# Patient Record
Sex: MALE | Race: WHITE | Employment: FULL TIME | URBAN - METROPOLITAN AREA
[De-identification: names, ages, dates, MRNs, and addresses within clinical notes are randomized per-mention and may not be internally consistent; named-entity substitution may affect disease eponyms.]

---

## 2017-02-17 RX ORDER — ATORVASTATIN CALCIUM 20 MG/1
TABLET, FILM COATED ORAL
Qty: 30 TABLET | Refills: 3 | Status: SHIPPED | OUTPATIENT
Start: 2017-02-17 | End: 2017-06-27 | Stop reason: SDUPTHER

## 2017-03-17 DIAGNOSIS — I20.9 ISCHEMIC CHEST PAIN (HCC): ICD-10-CM

## 2017-03-17 DIAGNOSIS — Z95.820 S/P ANGIOPLASTY WITH STENT: ICD-10-CM

## 2017-03-17 DIAGNOSIS — I25.10 CORONARY ARTERY DISEASE INVOLVING NATIVE CORONARY ARTERY OF NATIVE HEART WITHOUT ANGINA PECTORIS: ICD-10-CM

## 2017-03-17 LAB
A/G RATIO: 1.8 (ref 1.1–2.2)
ALBUMIN SERPL-MCNC: 4.3 G/DL (ref 3.4–5)
ALP BLD-CCNC: 75 U/L (ref 40–129)
ALT SERPL-CCNC: 23 U/L (ref 10–40)
ANION GAP SERPL CALCULATED.3IONS-SCNC: 14 MMOL/L (ref 3–16)
AST SERPL-CCNC: 17 U/L (ref 15–37)
BILIRUB SERPL-MCNC: 0.4 MG/DL (ref 0–1)
BILIRUBIN DIRECT: <0.2 MG/DL (ref 0–0.3)
BILIRUBIN, INDIRECT: NORMAL MG/DL (ref 0–1)
BUN BLDV-MCNC: 16 MG/DL (ref 7–20)
CALCIUM SERPL-MCNC: 9.7 MG/DL (ref 8.3–10.6)
CHLORIDE BLD-SCNC: 99 MMOL/L (ref 99–110)
CHOLESTEROL, TOTAL: 131 MG/DL (ref 0–199)
CO2: 26 MMOL/L (ref 21–32)
CREAT SERPL-MCNC: 1 MG/DL (ref 0.9–1.3)
GFR AFRICAN AMERICAN: >60
GFR NON-AFRICAN AMERICAN: >60
GLOBULIN: 2.4 G/DL
GLUCOSE BLD-MCNC: 85 MG/DL (ref 70–99)
HCT VFR BLD CALC: 46.8 % (ref 40.5–52.5)
HDLC SERPL-MCNC: 37 MG/DL (ref 40–60)
HEMOGLOBIN: 15.9 G/DL (ref 13.5–17.5)
LDL CHOLESTEROL CALCULATED: 69 MG/DL
MAGNESIUM: 2 MG/DL (ref 1.8–2.4)
MCH RBC QN AUTO: 29.7 PG (ref 26–34)
MCHC RBC AUTO-ENTMCNC: 33.9 G/DL (ref 31–36)
MCV RBC AUTO: 87.7 FL (ref 80–100)
PDW BLD-RTO: 12.7 % (ref 12.4–15.4)
PLATELET # BLD: 248 K/UL (ref 135–450)
PMV BLD AUTO: 9.5 FL (ref 5–10.5)
POTASSIUM SERPL-SCNC: 4.2 MMOL/L (ref 3.5–5.1)
RBC # BLD: 5.34 M/UL (ref 4.2–5.9)
SODIUM BLD-SCNC: 139 MMOL/L (ref 136–145)
TOTAL PROTEIN: 6.7 G/DL (ref 6.4–8.2)
TRIGL SERPL-MCNC: 123 MG/DL (ref 0–150)
TSH REFLEX FT4: 2.25 UIU/ML (ref 0.27–4.2)
VLDLC SERPL CALC-MCNC: 25 MG/DL
WBC # BLD: 8.8 K/UL (ref 4–11)

## 2017-06-01 RX ORDER — CLOPIDOGREL BISULFATE 75 MG/1
TABLET ORAL
Qty: 30 TABLET | Refills: 6 | Status: SHIPPED | OUTPATIENT
Start: 2017-06-01 | End: 2018-01-12 | Stop reason: SDUPTHER

## 2017-06-02 RX ORDER — LISINOPRIL 2.5 MG/1
2.5 TABLET ORAL DAILY
Qty: 30 TABLET | Refills: 6 | Status: SHIPPED | OUTPATIENT
Start: 2017-06-02 | End: 2018-01-10 | Stop reason: SDUPTHER

## 2017-06-22 ENCOUNTER — OFFICE VISIT (OUTPATIENT)
Dept: CARDIOLOGY CLINIC | Age: 57
End: 2017-06-22

## 2017-06-22 VITALS
BODY MASS INDEX: 24.88 KG/M2 | WEIGHT: 178.4 LBS | HEART RATE: 64 BPM | SYSTOLIC BLOOD PRESSURE: 128 MMHG | DIASTOLIC BLOOD PRESSURE: 62 MMHG

## 2017-06-22 DIAGNOSIS — I25.10 CORONARY ARTERY DISEASE INVOLVING NATIVE CORONARY ARTERY OF NATIVE HEART WITHOUT ANGINA PECTORIS: Primary | ICD-10-CM

## 2017-06-22 DIAGNOSIS — Z95.820 S/P ANGIOPLASTY WITH STENT: ICD-10-CM

## 2017-06-22 PROCEDURE — 99214 OFFICE O/P EST MOD 30 MIN: CPT | Performed by: INTERNAL MEDICINE

## 2017-06-27 RX ORDER — ATORVASTATIN CALCIUM 20 MG/1
TABLET, FILM COATED ORAL
Qty: 30 TABLET | Refills: 6 | Status: SHIPPED | OUTPATIENT
Start: 2017-06-27 | End: 2018-01-26 | Stop reason: SDUPTHER

## 2017-11-01 ENCOUNTER — HOSPITAL ENCOUNTER (OUTPATIENT)
Dept: OTHER | Age: 57
Discharge: HOME | End: 2017-11-30
Attending: INTERNAL MEDICINE | Admitting: INTERNAL MEDICINE

## 2017-12-01 ENCOUNTER — HOSPITAL ENCOUNTER (OUTPATIENT)
Dept: OTHER | Age: 57
Discharge: HOME | End: 2017-12-31
Attending: INTERNAL MEDICINE | Admitting: INTERNAL MEDICINE

## 2018-01-01 ENCOUNTER — HOSPITAL ENCOUNTER (OUTPATIENT)
Dept: OTHER | Age: 58
Discharge: HOME | End: 2018-01-31
Attending: INTERNAL MEDICINE | Admitting: INTERNAL MEDICINE

## 2018-01-10 RX ORDER — LISINOPRIL 2.5 MG/1
2.5 TABLET ORAL DAILY
Qty: 30 TABLET | Refills: 6 | Status: SHIPPED | OUTPATIENT
Start: 2018-01-10 | End: 2018-08-07 | Stop reason: SDUPTHER

## 2018-01-12 RX ORDER — CLOPIDOGREL BISULFATE 75 MG/1
75 TABLET ORAL DAILY
Qty: 30 TABLET | Refills: 0 | Status: CANCELLED | OUTPATIENT
Start: 2018-01-12

## 2018-01-12 RX ORDER — CLOPIDOGREL BISULFATE 75 MG/1
75 TABLET ORAL DAILY
Qty: 30 TABLET | Refills: 6 | Status: SHIPPED | OUTPATIENT
Start: 2018-01-12 | End: 2018-08-09 | Stop reason: SDUPTHER

## 2018-01-26 ENCOUNTER — TELEPHONE (OUTPATIENT)
Dept: CARDIOLOGY CLINIC | Age: 58
End: 2018-01-26

## 2018-01-26 RX ORDER — ATORVASTATIN CALCIUM 20 MG/1
TABLET, FILM COATED ORAL
Qty: 90 TABLET | Refills: 3 | Status: SHIPPED | OUTPATIENT
Start: 2018-01-26 | End: 2019-02-19 | Stop reason: SDUPTHER

## 2018-02-01 ENCOUNTER — HOSPITAL ENCOUNTER (OUTPATIENT)
Dept: OTHER | Age: 58
Discharge: HOME | End: 2018-02-28
Attending: INTERNAL MEDICINE | Admitting: INTERNAL MEDICINE

## 2018-03-01 ENCOUNTER — HOSPITAL ENCOUNTER (OUTPATIENT)
Dept: OTHER | Age: 58
Discharge: HOME | End: 2018-03-31
Attending: INTERNAL MEDICINE | Admitting: INTERNAL MEDICINE

## 2018-04-01 ENCOUNTER — HOSPITAL ENCOUNTER (OUTPATIENT)
Dept: OTHER | Age: 58
Discharge: HOME | End: 2018-04-30
Attending: INTERNAL MEDICINE | Admitting: INTERNAL MEDICINE

## 2018-05-01 ENCOUNTER — HOSPITAL ENCOUNTER (OUTPATIENT)
Dept: OTHER | Age: 58
Discharge: HOME | End: 2018-05-31
Attending: INTERNAL MEDICINE | Admitting: INTERNAL MEDICINE

## 2018-06-01 ENCOUNTER — HOSPITAL ENCOUNTER (OUTPATIENT)
Dept: OTHER | Age: 58
Discharge: HOME | End: 2018-06-30
Attending: INTERNAL MEDICINE | Admitting: INTERNAL MEDICINE

## 2018-06-07 DIAGNOSIS — I25.10 CORONARY ARTERY DISEASE INVOLVING NATIVE CORONARY ARTERY OF NATIVE HEART WITHOUT ANGINA PECTORIS: Primary | ICD-10-CM

## 2018-06-07 DIAGNOSIS — R07.9 CHEST PAIN, UNSPECIFIED TYPE: ICD-10-CM

## 2018-06-07 DIAGNOSIS — Z95.820 S/P ANGIOPLASTY WITH STENT: ICD-10-CM

## 2018-06-08 DIAGNOSIS — R07.9 CHEST PAIN, UNSPECIFIED TYPE: ICD-10-CM

## 2018-06-08 DIAGNOSIS — I25.10 CORONARY ARTERY DISEASE INVOLVING NATIVE CORONARY ARTERY OF NATIVE HEART WITHOUT ANGINA PECTORIS: ICD-10-CM

## 2018-06-08 DIAGNOSIS — Z95.820 S/P ANGIOPLASTY WITH STENT: ICD-10-CM

## 2018-06-08 LAB
A/G RATIO: 1.9 (ref 1.1–2.2)
ALBUMIN SERPL-MCNC: 4.3 G/DL (ref 3.4–5)
ALP BLD-CCNC: 74 U/L (ref 40–129)
ALT SERPL-CCNC: 16 U/L (ref 10–40)
ANION GAP SERPL CALCULATED.3IONS-SCNC: 13 MMOL/L (ref 3–16)
AST SERPL-CCNC: 14 U/L (ref 15–37)
BASOPHILS ABSOLUTE: 0 K/UL (ref 0–0.2)
BASOPHILS RELATIVE PERCENT: 0.2 %
BILIRUB SERPL-MCNC: 0.4 MG/DL (ref 0–1)
BILIRUBIN DIRECT: <0.2 MG/DL (ref 0–0.3)
BILIRUBIN, INDIRECT: NORMAL MG/DL (ref 0–1)
BUN BLDV-MCNC: 18 MG/DL (ref 7–20)
CALCIUM SERPL-MCNC: 9.7 MG/DL (ref 8.3–10.6)
CHLORIDE BLD-SCNC: 102 MMOL/L (ref 99–110)
CHOLESTEROL, TOTAL: 124 MG/DL (ref 0–199)
CO2: 27 MMOL/L (ref 21–32)
CREAT SERPL-MCNC: 0.9 MG/DL (ref 0.9–1.3)
EOSINOPHILS ABSOLUTE: 0.2 K/UL (ref 0–0.6)
EOSINOPHILS RELATIVE PERCENT: 2 %
GFR AFRICAN AMERICAN: >60
GFR NON-AFRICAN AMERICAN: >60
GLOBULIN: 2.3 G/DL
GLUCOSE BLD-MCNC: 87 MG/DL (ref 70–99)
HCT VFR BLD CALC: 46.7 % (ref 40.5–52.5)
HDLC SERPL-MCNC: 35 MG/DL (ref 40–60)
HEMOGLOBIN: 15.9 G/DL (ref 13.5–17.5)
LDL CHOLESTEROL CALCULATED: 67 MG/DL
LYMPHOCYTES ABSOLUTE: 1.7 K/UL (ref 1–5.1)
LYMPHOCYTES RELATIVE PERCENT: 18.6 %
MAGNESIUM: 2 MG/DL (ref 1.8–2.4)
MCH RBC QN AUTO: 29.8 PG (ref 26–34)
MCHC RBC AUTO-ENTMCNC: 34.1 G/DL (ref 31–36)
MCV RBC AUTO: 87.6 FL (ref 80–100)
MONOCYTES ABSOLUTE: 0.7 K/UL (ref 0–1.3)
MONOCYTES RELATIVE PERCENT: 7.2 %
NEUTROPHILS ABSOLUTE: 6.7 K/UL (ref 1.7–7.7)
NEUTROPHILS RELATIVE PERCENT: 72 %
PDW BLD-RTO: 13 % (ref 12.4–15.4)
PLATELET # BLD: 236 K/UL (ref 135–450)
PMV BLD AUTO: 9.5 FL (ref 5–10.5)
POTASSIUM SERPL-SCNC: 4.4 MMOL/L (ref 3.5–5.1)
RBC # BLD: 5.33 M/UL (ref 4.2–5.9)
SODIUM BLD-SCNC: 142 MMOL/L (ref 136–145)
TOTAL PROTEIN: 6.6 G/DL (ref 6.4–8.2)
TRIGL SERPL-MCNC: 111 MG/DL (ref 0–150)
TSH REFLEX FT4: 1.89 UIU/ML (ref 0.27–4.2)
VLDLC SERPL CALC-MCNC: 22 MG/DL
WBC # BLD: 9.2 K/UL (ref 4–11)

## 2018-06-26 ENCOUNTER — OFFICE VISIT (OUTPATIENT)
Dept: CARDIOLOGY CLINIC | Age: 58
End: 2018-06-26

## 2018-06-26 VITALS
DIASTOLIC BLOOD PRESSURE: 68 MMHG | HEART RATE: 58 BPM | WEIGHT: 171.4 LBS | BODY MASS INDEX: 23.91 KG/M2 | SYSTOLIC BLOOD PRESSURE: 104 MMHG

## 2018-06-26 DIAGNOSIS — Z95.820 S/P ANGIOPLASTY WITH STENT: ICD-10-CM

## 2018-06-26 DIAGNOSIS — I25.10 CORONARY ARTERY DISEASE INVOLVING NATIVE CORONARY ARTERY OF NATIVE HEART WITHOUT ANGINA PECTORIS: Primary | ICD-10-CM

## 2018-06-26 PROCEDURE — 99214 OFFICE O/P EST MOD 30 MIN: CPT | Performed by: INTERNAL MEDICINE

## 2018-06-26 RX ORDER — SILDENAFIL 100 MG/1
100 TABLET, FILM COATED ORAL PRN
Qty: 30 TABLET | Refills: 1 | Status: SHIPPED | OUTPATIENT
Start: 2018-06-26 | End: 2019-07-09

## 2018-07-01 ENCOUNTER — HOSPITAL ENCOUNTER (OUTPATIENT)
Dept: OTHER | Age: 58
Discharge: HOME | End: 2018-07-01
Attending: INTERNAL MEDICINE | Admitting: INTERNAL MEDICINE

## 2018-08-07 ENCOUNTER — TELEPHONE (OUTPATIENT)
Dept: CARDIOLOGY CLINIC | Age: 58
End: 2018-08-07

## 2018-08-07 DIAGNOSIS — I25.10 CORONARY ARTERY DISEASE INVOLVING NATIVE CORONARY ARTERY OF NATIVE HEART WITHOUT ANGINA PECTORIS: Primary | ICD-10-CM

## 2018-08-07 RX ORDER — LISINOPRIL 2.5 MG/1
2.5 TABLET ORAL DAILY
Qty: 90 TABLET | Refills: 3 | Status: SHIPPED | OUTPATIENT
Start: 2018-08-07 | End: 2021-05-19

## 2018-08-10 RX ORDER — CLOPIDOGREL BISULFATE 75 MG/1
75 TABLET ORAL DAILY
Qty: 90 TABLET | Refills: 3 | Status: SHIPPED | OUTPATIENT
Start: 2018-08-10 | End: 2019-08-15 | Stop reason: SDUPTHER

## 2018-08-10 RX ORDER — CLOPIDOGREL BISULFATE 75 MG/1
75 TABLET ORAL DAILY
Qty: 30 TABLET | Refills: 6 | Status: SHIPPED | OUTPATIENT
Start: 2018-08-10 | End: 2019-01-08 | Stop reason: ALTCHOICE

## 2018-12-20 ENCOUNTER — TELEPHONE (OUTPATIENT)
Dept: CARDIOLOGY CLINIC | Age: 58
End: 2018-12-20

## 2019-01-08 ENCOUNTER — OFFICE VISIT (OUTPATIENT)
Dept: CARDIOLOGY CLINIC | Age: 59
End: 2019-01-08

## 2019-01-08 VITALS
WEIGHT: 169 LBS | SYSTOLIC BLOOD PRESSURE: 96 MMHG | DIASTOLIC BLOOD PRESSURE: 70 MMHG | HEART RATE: 68 BPM | BODY MASS INDEX: 23.57 KG/M2

## 2019-01-08 DIAGNOSIS — I25.10 CORONARY ARTERY DISEASE INVOLVING NATIVE CORONARY ARTERY OF NATIVE HEART WITHOUT ANGINA PECTORIS: ICD-10-CM

## 2019-01-08 DIAGNOSIS — Z95.820 S/P ANGIOPLASTY WITH STENT: Primary | ICD-10-CM

## 2019-01-08 DIAGNOSIS — R07.9 CHEST PAIN, UNSPECIFIED TYPE: ICD-10-CM

## 2019-01-08 PROCEDURE — 99214 OFFICE O/P EST MOD 30 MIN: CPT | Performed by: NURSE PRACTITIONER

## 2019-02-19 RX ORDER — ATORVASTATIN CALCIUM 20 MG/1
TABLET, FILM COATED ORAL
Qty: 90 TABLET | Refills: 3 | Status: SHIPPED | OUTPATIENT
Start: 2019-02-19 | End: 2020-08-05 | Stop reason: SDUPTHER

## 2019-07-01 DIAGNOSIS — Z95.820 S/P ANGIOPLASTY WITH STENT: Primary | ICD-10-CM

## 2019-07-01 DIAGNOSIS — I25.10 CORONARY ARTERY DISEASE INVOLVING NATIVE CORONARY ARTERY OF NATIVE HEART WITHOUT ANGINA PECTORIS: ICD-10-CM

## 2019-07-03 LAB
ANION GAP SERPL CALCULATED.3IONS-SCNC: 12 MMOL/L (ref 3–16)
BUN BLDV-MCNC: 18 MG/DL (ref 7–20)
CALCIUM SERPL-MCNC: 9.6 MG/DL (ref 8.3–10.6)
CHLORIDE BLD-SCNC: 101 MMOL/L (ref 99–110)
CHOLESTEROL, TOTAL: 121 MG/DL (ref 0–199)
CO2: 27 MMOL/L (ref 21–32)
CREAT SERPL-MCNC: 1.1 MG/DL (ref 0.9–1.3)
GFR AFRICAN AMERICAN: >60
GFR NON-AFRICAN AMERICAN: >60
GLUCOSE BLD-MCNC: 83 MG/DL (ref 70–99)
HCT VFR BLD CALC: 46.9 % (ref 40.5–52.5)
HDLC SERPL-MCNC: 38 MG/DL (ref 40–60)
HEMOGLOBIN: 15.8 G/DL (ref 13.5–17.5)
LDL CHOLESTEROL CALCULATED: 60 MG/DL
MCH RBC QN AUTO: 29.9 PG (ref 26–34)
MCHC RBC AUTO-ENTMCNC: 33.7 G/DL (ref 31–36)
MCV RBC AUTO: 89 FL (ref 80–100)
PDW BLD-RTO: 12.8 % (ref 12.4–15.4)
PLATELET # BLD: 257 K/UL (ref 135–450)
PMV BLD AUTO: 9.8 FL (ref 5–10.5)
POTASSIUM SERPL-SCNC: 4.4 MMOL/L (ref 3.5–5.1)
RBC # BLD: 5.27 M/UL (ref 4.2–5.9)
SODIUM BLD-SCNC: 140 MMOL/L (ref 136–145)
TRIGL SERPL-MCNC: 115 MG/DL (ref 0–150)
TSH SERPL DL<=0.05 MIU/L-ACNC: 2.17 UIU/ML (ref 0.27–4.2)
VLDLC SERPL CALC-MCNC: 23 MG/DL
WBC # BLD: 10.2 K/UL (ref 4–11)

## 2019-07-08 PROCEDURE — 9900000038 HC CARDIAC REHAB PHASE 3 - MONTHLY

## 2019-07-09 ENCOUNTER — OFFICE VISIT (OUTPATIENT)
Dept: CARDIOLOGY CLINIC | Age: 59
End: 2019-07-09
Payer: COMMERCIAL

## 2019-07-09 VITALS
HEART RATE: 62 BPM | DIASTOLIC BLOOD PRESSURE: 60 MMHG | WEIGHT: 169.4 LBS | SYSTOLIC BLOOD PRESSURE: 85 MMHG | BODY MASS INDEX: 23.63 KG/M2

## 2019-07-09 DIAGNOSIS — R07.9 CHEST PAIN, UNSPECIFIED TYPE: Primary | ICD-10-CM

## 2019-07-09 PROCEDURE — 99214 OFFICE O/P EST MOD 30 MIN: CPT | Performed by: INTERNAL MEDICINE

## 2019-07-09 PROCEDURE — 93000 ELECTROCARDIOGRAM COMPLETE: CPT | Performed by: INTERNAL MEDICINE

## 2019-07-29 ENCOUNTER — HOSPITAL ENCOUNTER (OUTPATIENT)
Dept: CARDIAC REHAB | Age: 59
Discharge: HOME | End: 2019-07-29
Payer: COMMERCIAL

## 2019-08-09 PROCEDURE — 9900000038 HC CARDIAC REHAB PHASE 3 - MONTHLY

## 2019-08-14 ENCOUNTER — TELEPHONE (OUTPATIENT)
Dept: CARDIOLOGY CLINIC | Age: 59
End: 2019-08-14

## 2019-08-14 DIAGNOSIS — I25.10 CORONARY ARTERY DISEASE INVOLVING NATIVE CORONARY ARTERY OF NATIVE HEART WITHOUT ANGINA PECTORIS: ICD-10-CM

## 2019-08-14 RX ORDER — CLOPIDOGREL BISULFATE 75 MG/1
75 TABLET ORAL DAILY
Qty: 90 TABLET | Refills: 2 | Status: CANCELLED | OUTPATIENT
Start: 2019-08-14

## 2019-08-15 DIAGNOSIS — I25.10 CORONARY ARTERY DISEASE INVOLVING NATIVE CORONARY ARTERY OF NATIVE HEART WITHOUT ANGINA PECTORIS: ICD-10-CM

## 2019-08-16 RX ORDER — CLOPIDOGREL BISULFATE 75 MG/1
75 TABLET ORAL DAILY
Qty: 90 TABLET | Refills: 3 | Status: SHIPPED | OUTPATIENT
Start: 2019-08-16 | End: 2020-08-14 | Stop reason: SDUPTHER

## 2019-08-26 ENCOUNTER — HOSPITAL ENCOUNTER (OUTPATIENT)
Dept: CARDIAC REHAB | Age: 59
Discharge: HOME | End: 2019-08-26
Payer: COMMERCIAL

## 2019-09-09 PROCEDURE — 9900000038 HC CARDIAC REHAB PHASE 3 - MONTHLY

## 2019-09-30 ENCOUNTER — HOSPITAL ENCOUNTER (OUTPATIENT)
Dept: CARDIAC REHAB | Age: 59
Discharge: HOME | End: 2019-09-30
Payer: COMMERCIAL

## 2019-10-18 PROCEDURE — 9900000038 HC CARDIAC REHAB PHASE 3 - MONTHLY

## 2019-10-28 ENCOUNTER — HOSPITAL ENCOUNTER (OUTPATIENT)
Dept: CARDIAC REHAB | Age: 59
Discharge: HOME | End: 2019-10-28
Payer: COMMERCIAL

## 2019-11-04 PROCEDURE — 9900000038 HC CARDIAC REHAB PHASE 3 - MONTHLY

## 2019-11-25 ENCOUNTER — HOSPITAL ENCOUNTER (OUTPATIENT)
Dept: CARDIAC REHAB | Age: 59
Discharge: HOME | End: 2019-11-25
Payer: COMMERCIAL

## 2019-12-04 PROCEDURE — 9900000038 HC CARDIAC REHAB PHASE 3 - MONTHLY

## 2019-12-30 ENCOUNTER — HOSPITAL ENCOUNTER (OUTPATIENT)
Dept: CARDIAC REHAB | Age: 59
Discharge: HOME | End: 2019-12-30
Payer: COMMERCIAL

## 2020-06-30 DIAGNOSIS — I25.10 CORONARY ARTERY DISEASE INVOLVING NATIVE CORONARY ARTERY OF NATIVE HEART WITHOUT ANGINA PECTORIS: ICD-10-CM

## 2020-06-30 DIAGNOSIS — Z95.820 S/P ANGIOPLASTY WITH STENT: ICD-10-CM

## 2020-06-30 DIAGNOSIS — R07.9 CHEST PAIN, UNSPECIFIED TYPE: Primary | ICD-10-CM

## 2020-06-30 DIAGNOSIS — Z00.00 ROUTINE CHECK-UP: ICD-10-CM

## 2020-07-06 LAB
A/G RATIO: 1.8 (ref 1.1–2.2)
ALBUMIN SERPL-MCNC: 4.2 G/DL (ref 3.4–5)
ALP BLD-CCNC: 79 U/L (ref 40–129)
ALT SERPL-CCNC: 19 U/L (ref 10–40)
ANION GAP SERPL CALCULATED.3IONS-SCNC: 11 MMOL/L (ref 3–16)
AST SERPL-CCNC: 18 U/L (ref 15–37)
BILIRUB SERPL-MCNC: 0.3 MG/DL (ref 0–1)
BILIRUBIN DIRECT: <0.2 MG/DL (ref 0–0.3)
BILIRUBIN, INDIRECT: NORMAL MG/DL (ref 0–1)
BUN BLDV-MCNC: 15 MG/DL (ref 7–20)
CALCIUM SERPL-MCNC: 9.4 MG/DL (ref 8.3–10.6)
CHLORIDE BLD-SCNC: 104 MMOL/L (ref 99–110)
CHOLESTEROL, TOTAL: 130 MG/DL (ref 0–199)
CO2: 24 MMOL/L (ref 21–32)
CREAT SERPL-MCNC: 0.9 MG/DL (ref 0.9–1.3)
GFR AFRICAN AMERICAN: >60
GFR NON-AFRICAN AMERICAN: >60
GLOBULIN: 2.3 G/DL
GLUCOSE BLD-MCNC: 93 MG/DL (ref 70–99)
HCT VFR BLD CALC: 47 % (ref 40.5–52.5)
HDLC SERPL-MCNC: 35 MG/DL (ref 40–60)
HEMOGLOBIN: 16 G/DL (ref 13.5–17.5)
LDL CHOLESTEROL CALCULATED: 71 MG/DL
MAGNESIUM: 2.2 MG/DL (ref 1.8–2.4)
MCH RBC QN AUTO: 29.9 PG (ref 26–34)
MCHC RBC AUTO-ENTMCNC: 34 G/DL (ref 31–36)
MCV RBC AUTO: 87.7 FL (ref 80–100)
PDW BLD-RTO: 13 % (ref 12.4–15.4)
PLATELET # BLD: 217 K/UL (ref 135–450)
PMV BLD AUTO: 9.9 FL (ref 5–10.5)
POTASSIUM SERPL-SCNC: 4.6 MMOL/L (ref 3.5–5.1)
RBC # BLD: 5.36 M/UL (ref 4.2–5.9)
SODIUM BLD-SCNC: 139 MMOL/L (ref 136–145)
TOTAL PROTEIN: 6.5 G/DL (ref 6.4–8.2)
TRIGL SERPL-MCNC: 120 MG/DL (ref 0–150)
TSH SERPL DL<=0.05 MIU/L-ACNC: 1.57 UIU/ML (ref 0.27–4.2)
VLDLC SERPL CALC-MCNC: 24 MG/DL
WBC # BLD: 8.5 K/UL (ref 4–11)

## 2020-07-14 ENCOUNTER — OFFICE VISIT (OUTPATIENT)
Dept: CARDIOLOGY CLINIC | Age: 60
End: 2020-07-14
Payer: COMMERCIAL

## 2020-07-14 VITALS
WEIGHT: 175 LBS | HEART RATE: 63 BPM | HEIGHT: 71 IN | SYSTOLIC BLOOD PRESSURE: 113 MMHG | BODY MASS INDEX: 24.5 KG/M2 | DIASTOLIC BLOOD PRESSURE: 70 MMHG | TEMPERATURE: 97.7 F

## 2020-07-14 PROCEDURE — 93000 ELECTROCARDIOGRAM COMPLETE: CPT | Performed by: INTERNAL MEDICINE

## 2020-07-14 PROCEDURE — 99214 OFFICE O/P EST MOD 30 MIN: CPT | Performed by: INTERNAL MEDICINE

## 2020-07-14 NOTE — PROGRESS NOTES
[facility]   Dr Misty Borges. Gus Sandoval MD, [facility]  Outpatient Follow Up Note         07/14/20  HPI:  Vicki Martinez is 61 y.o. male who presents today with CAD and post stenting on December 12/22/14. Mr. Jack Knox is in office and continues to do very well. He is now seeing Dr. Jade Nice for his urology. Apparently had some hematuria that was short-lived and possibly related to his medications. In the office his examination is stable blood pressure is 114/76. Examination shows no edema. CAD with stents December 22, 2014  Hypertension  Hyperlipidemia   Past Medical History:   Diagnosis Date    CAD (coronary artery disease)      PSH  Past Surgical History:   Procedure Laterality Date    CORONARY ANGIOPLASTY   12/23/14    stent mid circ     SH:       Social History     Tobacco Use   Smoking Status Never Smoker   Smokeless Tobacco Never Used     Current Medications:  Prior to Visit Medications    Medication Sig Taking? Authorizing Provider   clopidogrel (PLAVIX) 75 MG tablet Take 1 tablet by mouth daily Yes Talon Hernandez MD   atorvastatin (LIPITOR) 20 MG tablet TAKE 1 TABLET BY MOUTH DAILY Yes Talon Hernandez MD   sildenafil (VIAGRA) 50 MG tablet Take 1 tablet by mouth as needed for Erectile Dysfunction. Yes Talon Hernandez MD   aspirin 81 MG chewable tablet Take 1 tablet by mouth daily. Yes Vielka Wilhelm MD   lisinopril (PRINIVIL;ZESTRIL) 2.5 MG tablet Take 1 tablet by mouth daily  Patient not taking: Reported on 7/14/2020  HEATHER Payton - CNP   pantoprazole (PROTONIX) 40 MG tablet TAKE 1 TABLET BY MOUTH EVERY MORNING  Patient not taking: Reported on 7/14/2020  Talon Hernandez MD   metoprolol (LOPRESSOR) 25 MG tablet Take 0.5 tablets by mouth 2 times daily  Patient not taking: Reported on 7/14/2020  Talon Hernandez MD     Family History  No family history on file.        · ROS:   · Cardiovascular: Reviewed in HPI  · Allergic/Immunologic: No nasal congestion or hives. · All other ROS are reviewed and are unremarkable. PHYSICAL EXAM:      Wt Readings from Last 3 Encounters:   07/14/20 175 lb (79.4 kg)   07/09/19 169 lb 6.4 oz (76.8 kg)   01/08/19 169 lb (76.7 kg)       BP Readings from Last 3 Encounters:   07/14/20 113/70   07/09/19 85/60   01/08/19 96/70       Pulse Readings from Last 3 Encounters:   07/14/20 63   07/09/19 62   01/08/19 68     EKG on 7/14/2020 sinus rhythm 58/min. Otherwise normal study. Constitutional: This patient is oriented to person, place, and time. Also appears well-developed and well-nourished and in no acute distress. HEENT: Normocephalic and atraumatic. Sclerae anicteric. No xanthelasmas. Conjunctiva white, no subconjunctival hemorrhage   External inspection of ears nose teeth & gums   Eyes:PERRLA EOM's intact. Neck: Neck supple. No JVD present. Carotids without bruits. No mass and no thyromegaly. No lymphadenopathy    Cardiovascular: RRR, normal S1 and S2; no murmur/gallop or rub, PMI nondisplaced  Pulmonary/Chest: Effort normal.  Lungs clear to auscultation. Chest wall nontender  Abdominal: soft, nontender, nondistended. + bowel sounds; no organomegaly or bruits. Aorta normal  Extremities: No edema  Pulses are 2+ radial/carotid/dorsalis pedis bilaterally. Cap refill brisk. Neurological: No cranial nerve deficit. Psychiatric: This patient has a normal mood and affect and the speech is normal as is behavior    Assessment:     stable CAD  cath and PTCA  In the cx 12/22/2014 /  HTN   no c/o cp SOB edema / denies PND / denies JOHNIE  Recent hernia repair / doing well     HLD  LDL optimal   On Lipitor      Plan:   patient education on touch screen in room   Discussed heart heathy lifestyle including nutrition, exercise & importance of nonsmoking,       Still stable from a cardiac perspective. We will continue current medical management. Return to see us in 6 months.   Dann Prader, MD, [facility] [place] [facility]

## 2020-08-06 RX ORDER — ATORVASTATIN CALCIUM 20 MG/1
TABLET, FILM COATED ORAL
Qty: 90 TABLET | Refills: 3 | Status: SHIPPED | OUTPATIENT
Start: 2020-08-06 | End: 2021-06-04 | Stop reason: ALTCHOICE

## 2020-08-14 RX ORDER — CLOPIDOGREL BISULFATE 75 MG/1
75 TABLET ORAL DAILY
Qty: 90 TABLET | Refills: 1 | Status: SHIPPED | OUTPATIENT
Start: 2020-08-14 | End: 2021-02-08

## 2021-02-08 DIAGNOSIS — I25.10 CORONARY ARTERY DISEASE INVOLVING NATIVE CORONARY ARTERY OF NATIVE HEART WITHOUT ANGINA PECTORIS: ICD-10-CM

## 2021-02-08 RX ORDER — CLOPIDOGREL BISULFATE 75 MG/1
TABLET ORAL
Qty: 90 TABLET | Refills: 1 | Status: SHIPPED | OUTPATIENT
Start: 2021-02-08 | End: 2021-05-19 | Stop reason: SDUPTHER

## 2021-04-26 ENCOUNTER — OFFICE VISIT (OUTPATIENT)
Dept: CARDIOLOGY CLINIC | Age: 61
End: 2021-04-26
Payer: COMMERCIAL

## 2021-04-26 ENCOUNTER — TELEPHONE (OUTPATIENT)
Dept: CARDIOLOGY CLINIC | Age: 61
End: 2021-04-26

## 2021-04-26 VITALS
DIASTOLIC BLOOD PRESSURE: 78 MMHG | BODY MASS INDEX: 24.27 KG/M2 | WEIGHT: 174 LBS | SYSTOLIC BLOOD PRESSURE: 128 MMHG | HEART RATE: 68 BPM

## 2021-04-26 DIAGNOSIS — I25.10 CORONARY ARTERY DISEASE INVOLVING NATIVE CORONARY ARTERY OF NATIVE HEART WITHOUT ANGINA PECTORIS: ICD-10-CM

## 2021-04-26 DIAGNOSIS — R00.2 HEART PALPITATIONS: Primary | ICD-10-CM

## 2021-04-26 PROCEDURE — 93000 ELECTROCARDIOGRAM COMPLETE: CPT | Performed by: NURSE PRACTITIONER

## 2021-04-26 PROCEDURE — 99214 OFFICE O/P EST MOD 30 MIN: CPT | Performed by: NURSE PRACTITIONER

## 2021-04-26 PROCEDURE — 93246 EXT ECG>7D<15D RECORDING: CPT | Performed by: INTERNAL MEDICINE

## 2021-04-26 NOTE — PROGRESS NOTES
MORNING 30 tablet 3    metoprolol (LOPRESSOR) 25 MG tablet Take 0.5 tablets by mouth 2 times daily 60 tablet 6    sildenafil (VIAGRA) 50 MG tablet Take 1 tablet by mouth as needed for Erectile Dysfunction. 30 tablet 3    aspirin 81 MG chewable tablet Take 1 tablet by mouth daily. 30 tablet 3     No current facility-administered medications for this visit. REVIEW OF SYSTEMS:    CONSTITUTIONAL: No major weight gain or loss, night sweats, fever, fatigue, or weakness. HEENT: No new vision difficulties or ringing in the ears. RESPIRATORY: No new SOB, PND, orthopnea or cough. CARDIOVASCULAR: See HPI  GI: No N/V/D, constipation, or abdominal pain. No black/tarry stools  : No urinary urgency, incontinence, or hematuria. SKIN: No cyanosis or skin lesions. MUSCULOSKELETAL: No new muscle or joint pain. NEUROLOGICAL: No syncope or TIA-like symptoms. PSYCHIATRIC: No anxiety, pain, insomnia or depression        Objective:   PHYSICAL EXAM:        Vitals:    04/26/21 1035   BP: 128/78   Pulse: 68   Weight: 174 lb (78.9 kg)      VITALS:  /78   Pulse 68   Wt 174 lb (78.9 kg)   BMI 24.27 kg/m²   CONSTITUTIONAL: Cooperative, no apparent distress, and appears well nourished / developed  NEUROLOGIC:  Awake and orientated to person, place, and time. PSYCH: Calm affect. SKIN: Warm and dry. HEENT: Sclera non-icteric, normocephalic, neck supple. RESPIRATORY:  No increased work of breathing and clear to auscultation, no crackles or wheezing. CARDIOVASCULAR:  Regular rate and rhythm without murmur. Normal S1 and S2. No gallops or rubs. Normal PMI. No elevation of JVP. Normal carotid pulses with no bruits. GI:  Normal bowel sounds. Non-distended. Non-tender to palpation  EXT: No edema. No calf tenderness. Pulses are present bilaterally.     Wt Readings from Last 3 Encounters:   04/26/21 174 lb (78.9 kg)   07/14/20 175 lb (79.4 kg)   07/09/19 169 lb 6.4 oz (76.8 kg)      Pulse Readings from Last 3 Encounters: 04/26/21 68   07/14/20 63   07/09/19 62     BP Readings from Last 3 Encounters:   04/26/21 128/78   07/14/20 113/70   07/09/19 85/60        DATA:    Lab Results   Component Value Date    ALT 19 07/06/2020    AST 18 07/06/2020    ALKPHOS 79 07/06/2020    BILITOT 0.3 07/06/2020     Lab Results   Component Value Date    CREATININE 0.9 07/06/2020    BUN 15 07/06/2020     07/06/2020    K 4.6 07/06/2020     07/06/2020    CO2 24 07/06/2020     Lab Results   Component Value Date    TSH 1.57 07/06/2020     Lab Results   Component Value Date    WBC 8.5 07/06/2020    HGB 16.0 07/06/2020    HCT 47.0 07/06/2020    MCV 87.7 07/06/2020     07/06/2020     Lab Results   Component Value Date    TRIG 120 07/06/2020    TRIG 115 07/03/2019    TRIG 111 06/08/2018     Lab Results   Component Value Date    HDL 35 (L) 07/06/2020    HDL 38 (L) 07/03/2019    HDL 35 (L) 06/08/2018     Lab Results   Component Value Date    LDLCALC 71 07/06/2020    LDLCALC 60 07/03/2019    LDLCALC 67 06/08/2018     Lab Results   Component Value Date    LABVLDL 24 07/06/2020    LABVLDL 23 07/03/2019    LABVLDL 22 06/08/2018      No results found for: BNP  Radiology Review:  Pertinent images / reports were reviewed as a part of this visit and reveals the following:    Assessment:     CAD and post stenting on December 12/22/14   neg stress 2016  / on GDMT for CAD   . pal  HTN optimal     HLD  LDL 71 7/2020     Hx anxiety / not on meds  c/o feels anxious and stress with work issues     c/o feeling of palpitations for a few days  no SOB  / no cp / no c/o lightheadedness / no c/o n/v fever cough   states active physically with out any angina or SOB / c/o dull continuous mid sternal discomfort for few days states had GERD last night / has been lifting bags mulch and feels it may be muscle discomfort   The pain is reproducible / He appeers to be anxious about these issues and will do ischemic work up   d/t to hx CAD and no recent testing     Has had COVID vaccines   EKG today NSR no acute changes    GXT nuc / zio for one week / blood work   Fu in 2-3 weeks   Discussed dehydration, alcohol, decongestant, stress, energy drinks       Plan:   GXT nuc / zio for one week / blood work   Fu in 2-3 weeks     Overall the patient is stable from CV standpoint    I have addressed the patient's cardiac risk factors and adjusted pharmacologic treatment as needed. In addition, I have reinforced the need for patient directed risk factor modification. Further evaluation will be based upon the patient's clinical course and testing results. All questions and concerns were addressed to the patient. Alternatives to my treatment were discussed. The patient verbalizes understanding not to stop medications without discussing with us. Patient is on a beta-blocker  Patient is on an ACE / ARB  Patient is on a statin  Dual Antiplatelet therapy   Discussed exercise: 30min of sustained cardiovascular exercise most days of the week   Discussed Low saturated fat / Cholesterol diet. Thank you for allowing us to participate in the care of Edwin Damon     Estela Harrison APRN FNP She[facility]     Documentation of today's visit sent to PCP

## 2021-05-05 ENCOUNTER — HOSPITAL ENCOUNTER (OUTPATIENT)
Dept: NON INVASIVE DIAGNOSTICS | Age: 61
Discharge: HOME | End: 2021-05-05
Payer: COMMERCIAL

## 2021-05-05 DIAGNOSIS — R00.2 HEART PALPITATIONS: ICD-10-CM

## 2021-05-05 DIAGNOSIS — I25.10 CORONARY ARTERY DISEASE INVOLVING NATIVE CORONARY ARTERY OF NATIVE HEART WITHOUT ANGINA PECTORIS: ICD-10-CM

## 2021-05-05 LAB
A/G RATIO: 1.9 (ref 1.1–2.2)
ALBUMIN SERPL-MCNC: 4.5 G/DL (ref 3.4–5)
ALP BLD-CCNC: 81 U/L (ref 40–129)
ALT SERPL-CCNC: 25 U/L (ref 10–40)
ANION GAP SERPL CALCULATED.3IONS-SCNC: 9 MMOL/L (ref 3–16)
AST SERPL-CCNC: 21 U/L (ref 15–37)
BILIRUB SERPL-MCNC: 0.4 MG/DL (ref 0–1)
BILIRUBIN DIRECT: <0.2 MG/DL (ref 0–0.3)
BILIRUBIN, INDIRECT: NORMAL MG/DL (ref 0–1)
BUN BLDV-MCNC: 16 MG/DL (ref 7–20)
CALCIUM SERPL-MCNC: 9.2 MG/DL (ref 8.3–10.6)
CHLORIDE BLD-SCNC: 103 MMOL/L (ref 99–110)
CHOLESTEROL, TOTAL: 127 MG/DL (ref 0–199)
CO2: 30 MMOL/L (ref 21–32)
CREAT SERPL-MCNC: 0.9 MG/DL (ref 0.8–1.3)
GFR AFRICAN AMERICAN: >60
GFR NON-AFRICAN AMERICAN: >60
GLOBULIN: 2.4 G/DL
GLUCOSE BLD-MCNC: 81 MG/DL (ref 70–99)
HCT VFR BLD CALC: 48.1 % (ref 40.5–52.5)
HDLC SERPL-MCNC: 38 MG/DL (ref 40–60)
HEMOGLOBIN: 16.6 G/DL (ref 13.5–17.5)
LDL CHOLESTEROL CALCULATED: 67 MG/DL
LV EF: 57 %
LVEF MODALITY: NORMAL
MAGNESIUM: 1.9 MG/DL (ref 1.8–2.4)
MCH RBC QN AUTO: 30.1 PG (ref 26–34)
MCHC RBC AUTO-ENTMCNC: 34.6 G/DL (ref 31–36)
MCV RBC AUTO: 87 FL (ref 80–100)
PDW BLD-RTO: 12.6 % (ref 12.4–15.4)
PLATELET # BLD: 218 K/UL (ref 135–450)
PMV BLD AUTO: 10.2 FL (ref 5–10.5)
POTASSIUM SERPL-SCNC: 4.4 MMOL/L (ref 3.5–5.1)
RBC # BLD: 5.53 M/UL (ref 4.2–5.9)
SODIUM BLD-SCNC: 142 MMOL/L (ref 136–145)
TOTAL PROTEIN: 6.9 G/DL (ref 6.4–8.2)
TRIGL SERPL-MCNC: 112 MG/DL (ref 0–150)
TSH REFLEX FT4: 2.06 UIU/ML (ref 0.27–4.2)
VITAMIN D 25-HYDROXY: 46.9 NG/ML
VLDLC SERPL CALC-MCNC: 22 MG/DL
WBC # BLD: 8.8 K/UL (ref 4–11)

## 2021-05-05 PROCEDURE — 2580000003 HC RX 258: Performed by: NURSE PRACTITIONER

## 2021-05-05 PROCEDURE — A9502 TC99M TETROFOSMIN: HCPCS | Performed by: NURSE PRACTITIONER

## 2021-05-05 PROCEDURE — 93017 CV STRESS TEST TRACING ONLY: CPT

## 2021-05-05 PROCEDURE — 78452 HT MUSCLE IMAGE SPECT MULT: CPT

## 2021-05-05 PROCEDURE — 3430000000 HC RX DIAGNOSTIC RADIOPHARMACEUTICAL: Performed by: NURSE PRACTITIONER

## 2021-05-05 RX ORDER — SODIUM CHLORIDE 0.9 % (FLUSH) 0.9 %
10 SYRINGE (ML) INJECTION PRN
Status: DISCONTINUED | OUTPATIENT
Start: 2021-05-05 | End: 2021-05-06 | Stop reason: HOSPADM

## 2021-05-05 RX ADMIN — TETROFOSMIN 10 MILLICURIE: 1.38 INJECTION, POWDER, LYOPHILIZED, FOR SOLUTION INTRAVENOUS at 09:34

## 2021-05-05 RX ADMIN — TETROFOSMIN 31.5 MILLICURIE: 1.38 INJECTION, POWDER, LYOPHILIZED, FOR SOLUTION INTRAVENOUS at 11:12

## 2021-05-05 RX ADMIN — Medication 10 ML: at 09:34

## 2021-05-05 RX ADMIN — Medication 10 ML: at 11:13

## 2021-05-19 ENCOUNTER — OFFICE VISIT (OUTPATIENT)
Dept: CARDIOLOGY CLINIC | Age: 61
End: 2021-05-19
Payer: COMMERCIAL

## 2021-05-19 VITALS
WEIGHT: 174 LBS | BODY MASS INDEX: 24.27 KG/M2 | HEART RATE: 72 BPM | SYSTOLIC BLOOD PRESSURE: 94 MMHG | DIASTOLIC BLOOD PRESSURE: 60 MMHG

## 2021-05-19 DIAGNOSIS — R94.39 ABNORMAL NUCLEAR STRESS TEST: Primary | ICD-10-CM

## 2021-05-19 DIAGNOSIS — I25.10 CORONARY ARTERY DISEASE INVOLVING NATIVE CORONARY ARTERY OF NATIVE HEART WITHOUT ANGINA PECTORIS: ICD-10-CM

## 2021-05-19 PROCEDURE — 99214 OFFICE O/P EST MOD 30 MIN: CPT | Performed by: NURSE PRACTITIONER

## 2021-05-19 RX ORDER — CLOPIDOGREL BISULFATE 75 MG/1
75 TABLET ORAL DAILY
Qty: 90 TABLET | Refills: 3 | Status: SHIPPED | OUTPATIENT
Start: 2021-05-19 | End: 2021-12-14

## 2021-05-19 NOTE — PROGRESS NOTES
[facility]     Outpatient Follow Up Note  Dr Patrizia Tamayo MD,  Willard Ryan APRN,CNP CVNP      CHIEF COMPLAINT / HPI:  Follow Up   Ramesh Koch is 61 y.o. male who presents today with a history of  with a history of CAD and post stenting on December 12/22/14  // neg stress 2016  / on GDMT for CAD    b/p soft asymptomatic / not taking BB or ace  Per his self d/t low b/p  / he is taking asa lipitor plavix      Interval history:  here today after having stress which is abnormal,  completed for atypical cp and palpitations wit hx of CAD  HTN HLD anxiety    zio worn for palpitations and feeling of anxiety state related to work   He states he had his Covid vaccines   discussed stress test & [place]   zio results  not back thus far     Stress test: 5/5/2021    Overall findings represent a intermediate risk scan.    Normal LV size and systolic function.    Predominant reversible defect involving the basal and mid inferolateral    cardiac segments.    Findings are concerning for ischemia.    ECG: Normal EKG response. Last EKG Echo:Stress Test:if any any past Angiograms: last labs; reviewed with pt. / the results are utilized to determine my plan of care.   20-29 minutes with pt including reviewing tests/meds/plan of care       Past Medical History:   Diagnosis Date    CAD (coronary artery disease)      Social History:    Social History     Tobacco Use   Smoking Status Never Smoker   Smokeless Tobacco Never Used     Current Medications:  Current Outpatient Medications   Medication Sig Dispense Refill    clopidogrel (PLAVIX) 75 MG tablet Take 1 tablet by mouth daily 90 tablet 3    atorvastatin (LIPITOR) 20 MG tablet TAKE 1 TABLET BY MOUTH DAILY 90 tablet 3    pantoprazole (PROTONIX) 40 MG tablet TAKE 1 TABLET BY MOUTH EVERY MORNING 30 tablet 3    metoprolol (LOPRESSOR) 25 MG tablet Take 0.5 tablets by mouth 2 times daily 60 tablet 6    sildenafil (VIAGRA) 50 MG tablet Take 1 tablet by mouth as needed for Erectile Dysfunction. 30 tablet 3    aspirin 81 MG chewable tablet Take 1 tablet by mouth daily. 30 tablet 3     No current facility-administered medications for this visit. REVIEW OF SYSTEMS:    CONSTITUTIONAL: No major weight gain or loss, night sweats, fever, fatigue, or weakness. HEENT: No new vision difficulties or ringing in the ears. RESPIRATORY: No new SOB, PND, orthopnea or cough. CARDIOVASCULAR: See HPI  GI: No N/V/D, constipation, or abdominal pain. No black/tarry stools  : No urinary urgency, incontinence, or hematuria. SKIN: No cyanosis or skin lesions. MUSCULOSKELETAL: No new muscle or joint pain. NEUROLOGICAL: No syncope or TIA-like symptoms. PSYCHIATRIC: No anxiety, pain, insomnia or depression        Vitals:    05/19/21 1032   BP: 94/60   Pulse: 72   Weight: 174 lb (78.9 kg)      VITALS:  BP 94/60   Pulse 72   Wt 174 lb (78.9 kg)   BMI 24.27 kg/m²   CONSTITUTIONAL: Cooperative, no apparent distress, and appears well nourished / developed  NEUROLOGIC:  Awake and orientated to person, place, and time. PSYCH: Calm affect. SKIN: Warm and dry. HEENT: Sclera non-icteric, normocephalic, neck supple. RESPIRATORY:  No increased work of breathing and clear to auscultation, no crackles or wheezing. CARDIOVASCULAR:  Regular rate and rhythm without murmur. Normal S1 and S2. No gallops or rubs. Normal PMI. No elevation of JVP. Normal carotid pulses with no bruits. GI:  Normal bowel sounds. Non-distended. Non-tender to palpation  EXT: No edema. No calf tenderness. Pulses are present bilaterally.     Wt Readings from Last 3 Encounters:   05/19/21 174 lb (78.9 kg)   04/26/21 174 lb (78.9 kg)   07/14/20 175 lb (79.4 kg)      Pulse Readings from Last 3 Encounters:   05/19/21 72   04/26/21 68   07/14/20 63     BP Readings from Last 3 Encounters:   05/19/21 94/60   04/26/21 128/78   07/14/20 113/70        DATA:    Lab Results   Component Value Date    ALT 25 05/05/2021    AST 21 05/05/2021    ALKPHOS 81 05/05/2021    BILITOT 0.4 05/05/2021     Lab Results   Component Value Date    CREATININE 0.9 05/05/2021    BUN 16 05/05/2021     05/05/2021    K 4.4 05/05/2021     05/05/2021    CO2 30 05/05/2021     Lab Results   Component Value Date    TSH 1.57 07/06/2020     Lab Results   Component Value Date    WBC 8.8 05/05/2021    HGB 16.6 05/05/2021    HCT 48.1 05/05/2021    MCV 87.0 05/05/2021     05/05/2021       Lab Results   Component Value Date    TRIG 112 05/05/2021    TRIG 120 07/06/2020    TRIG 115 07/03/2019     Lab Results   Component Value Date    HDL 38 (L) 05/05/2021    HDL 35 (L) 07/06/2020    HDL 38 (L) 07/03/2019     Lab Results   Component Value Date    LDLCALC 67 05/05/2021    LDLCALC 71 07/06/2020    LDLCALC 60 07/03/2019     Lab Results   Component Value Date    LABVLDL 22 05/05/2021    LABVLDL 24 07/06/2020    LABVLDL 23 07/03/2019     Radiology Review:  Pertinent images / reports were reviewed as a part of this visit and reveals the following:    Assessment:     history of CAD   stenti on December 12/22/14  // neg stress 2016  / on GDMT for CAD      Hypotensive   b/p soft asymptomatic / not taking BB or ace  Per his self d/t low b/p  / he is taking asa lipitor plavix      Had typical cp with stress tese   Stress test: 5/5/2021    Overall findings represent a intermediate risk scan.    Normal LV size and systolic function.    Predominant reversible defect involving the basal and mid inferolateral    cardiac segments.    Findings are concerning for ischemia.    ECG: Normal EKG response.      Plan:   here today after having stress which is abnormal,  completed for atypical cp and palpitations wit hx of CAD  HTN HLD anxiety    zio worn for palpitations and feeling of anxiety state related to work   He states he had his Covid vaccines   [facility] indicated, risks benefits & alternatives has been discussed   Fu with me approx a week after [facility]       I have addressed the patient's cardiac risk factors and adjusted pharmacologic treatment as needed. In addition, I have reinforced the need for patient directed risk factor modification. Further evaluation will be based upon the patient's clinical course and testing results. All questions and concerns were addressed to the patient. Alternatives to my treatment were discussed. The patient verbalizes understanding not to stop medications without discussing with us. Patient is on a beta-blocker no b/p low   Patient is on an ACE / ARB no b/p low   Patient is on a statin yes  Dual Antiplatelet therapy  Yes   Discussed exercise: 30min of sustained cardiovascular exercise most days of the week   Discussed Low saturated fat / Cholesterol diet. Thank you for allowing us to participate in the care of Gunner Tato.     Pawel ROMERO [facility] [address]     Documentation of today's visit sent to PCP

## 2021-05-24 NOTE — PRE-PROCEDURE INSTRUCTIONS
Called patient about procedure. Told to be here at 1230 for procedure at 1400. Must be NPO after midnight but can take morning medication with sips of water. Patient stated they are not on blood thinners. Told to have a responsible adult with them to take them home and stay with them afterwards, if they do not get admitted to hospital. Also, to bring a current list of medications. No other questions or concerns.

## 2021-05-25 ENCOUNTER — HOSPITAL ENCOUNTER (INPATIENT)
Dept: CARDIAC CATH/INVASIVE PROCEDURES | Age: 61
LOS: 1 days | Discharge: HOME | DRG: 247 | End: 2021-05-26
Attending: INTERNAL MEDICINE | Admitting: INTERNAL MEDICINE
Payer: COMMERCIAL

## 2021-05-25 PROBLEM — I25.729 CORONARY ARTERY DISEASE INVOLVING AUTOLOGOUS ARTERY CORONARY BYPASS GRAFT WITH ANGINA PECTORIS (HCC): Status: ACTIVE | Noted: 2021-05-25

## 2021-05-25 LAB
A/G RATIO: 1.2 (ref 1.1–2.2)
ALBUMIN SERPL-MCNC: 4.2 G/DL (ref 3.4–5)
ALP BLD-CCNC: 95 U/L (ref 40–129)
ALT SERPL-CCNC: 22 U/L (ref 10–40)
ANION GAP SERPL CALCULATED.3IONS-SCNC: 12 MMOL/L (ref 3–16)
AST SERPL-CCNC: 19 U/L (ref 15–37)
BILIRUB SERPL-MCNC: 0.5 MG/DL (ref 0–1)
BUN BLDV-MCNC: 16 MG/DL (ref 7–20)
CALCIUM SERPL-MCNC: 9.7 MG/DL (ref 8.3–10.6)
CHLORIDE BLD-SCNC: 102 MMOL/L (ref 99–110)
CO2: 26 MMOL/L (ref 21–32)
CREAT SERPL-MCNC: 1.1 MG/DL (ref 0.8–1.3)
EKG ATRIAL RATE: 64 BPM
EKG DIAGNOSIS: NORMAL
EKG P AXIS: 69 DEGREES
EKG P-R INTERVAL: 176 MS
EKG Q-T INTERVAL: 382 MS
EKG QRS DURATION: 84 MS
EKG QTC CALCULATION (BAZETT): 394 MS
EKG R AXIS: 55 DEGREES
EKG T AXIS: 62 DEGREES
EKG VENTRICULAR RATE: 64 BPM
GFR AFRICAN AMERICAN: >60
GFR NON-AFRICAN AMERICAN: >60
GLOBULIN: 3.4 G/DL
GLUCOSE BLD-MCNC: 82 MG/DL (ref 70–99)
HCT VFR BLD CALC: 48.4 % (ref 40.5–52.5)
HEMOGLOBIN: 16.5 G/DL (ref 13.5–17.5)
INR BLD: 1.09 (ref 0.86–1.14)
LEFT VENTRICULAR EJECTION FRACTION MODE: NORMAL
LV EF: 65 %
MCH RBC QN AUTO: 30.3 PG (ref 26–34)
MCHC RBC AUTO-ENTMCNC: 34.1 G/DL (ref 31–36)
MCV RBC AUTO: 88.7 FL (ref 80–100)
PDW BLD-RTO: 13 % (ref 12.4–15.4)
PLATELET # BLD: 237 K/UL (ref 135–450)
PMV BLD AUTO: 9.8 FL (ref 5–10.5)
POC ACT LR: 291 SEC
POC ACT LR: 385 SEC
POTASSIUM SERPL-SCNC: 3.9 MMOL/L (ref 3.5–5.1)
PROTHROMBIN TIME: 12.6 SEC (ref 10–13.2)
RBC # BLD: 5.46 M/UL (ref 4.2–5.9)
SODIUM BLD-SCNC: 140 MMOL/L (ref 136–145)
TOTAL PROTEIN: 7.6 G/DL (ref 6.4–8.2)
WBC # BLD: 11.2 K/UL (ref 4–11)

## 2021-05-25 PROCEDURE — B2111ZZ FLUOROSCOPY OF MULTIPLE CORONARY ARTERIES USING LOW OSMOLAR CONTRAST: ICD-10-PCS | Performed by: INTERNAL MEDICINE

## 2021-05-25 PROCEDURE — 6360000002 HC RX W HCPCS: Performed by: INTERNAL MEDICINE

## 2021-05-25 PROCEDURE — 99153 MOD SED SAME PHYS/QHP EA: CPT

## 2021-05-25 PROCEDURE — 92978 ENDOLUMINL IVUS OCT C 1ST: CPT

## 2021-05-25 PROCEDURE — 1200000000 HC SEMI PRIVATE

## 2021-05-25 PROCEDURE — 4A023N7 MEASUREMENT OF CARDIAC SAMPLING AND PRESSURE, LEFT HEART, PERCUTANEOUS APPROACH: ICD-10-PCS | Performed by: INTERNAL MEDICINE

## 2021-05-25 PROCEDURE — 92928 PRQ TCAT PLMT NTRAC ST 1 LES: CPT

## 2021-05-25 PROCEDURE — 92921 HC PRQ CARDIAC ANGIO ADDL ART: CPT

## 2021-05-25 PROCEDURE — 92979 ENDOLUMINL IVUS OCT C EA: CPT

## 2021-05-25 PROCEDURE — 93010 ELECTROCARDIOGRAM REPORT: CPT | Performed by: INTERNAL MEDICINE

## 2021-05-25 PROCEDURE — C1874 STENT, COATED/COV W/DEL SYS: HCPCS

## 2021-05-25 PROCEDURE — 6360000002 HC RX W HCPCS

## 2021-05-25 PROCEDURE — 80053 COMPREHEN METABOLIC PANEL: CPT

## 2021-05-25 PROCEDURE — 99024 POSTOP FOLLOW-UP VISIT: CPT | Performed by: INTERNAL MEDICINE

## 2021-05-25 PROCEDURE — 85027 COMPLETE CBC AUTOMATED: CPT

## 2021-05-25 PROCEDURE — B241ZZ3 ULTRASONOGRAPHY OF MULTIPLE CORONARY ARTERIES, INTRAVASCULAR: ICD-10-PCS | Performed by: INTERNAL MEDICINE

## 2021-05-25 PROCEDURE — B2151ZZ FLUOROSCOPY OF LEFT HEART USING LOW OSMOLAR CONTRAST: ICD-10-PCS | Performed by: INTERNAL MEDICINE

## 2021-05-25 PROCEDURE — 6370000000 HC RX 637 (ALT 250 FOR IP): Performed by: INTERNAL MEDICINE

## 2021-05-25 PROCEDURE — 93458 L HRT ARTERY/VENTRICLE ANGIO: CPT | Performed by: INTERNAL MEDICINE

## 2021-05-25 PROCEDURE — C1894 INTRO/SHEATH, NON-LASER: HCPCS

## 2021-05-25 PROCEDURE — 92921 PR PRQ TRLUML CORONARY ANGIOPLASTY ADDL BRANCH: CPT | Performed by: INTERNAL MEDICINE

## 2021-05-25 PROCEDURE — 92979 ENDOLUMINL IVUS OCT C EA: CPT | Performed by: INTERNAL MEDICINE

## 2021-05-25 PROCEDURE — C1753 CATH, INTRAVAS ULTRASOUND: HCPCS

## 2021-05-25 PROCEDURE — C1725 CATH, TRANSLUMIN NON-LASER: HCPCS

## 2021-05-25 PROCEDURE — 99152 MOD SED SAME PHYS/QHP 5/>YRS: CPT

## 2021-05-25 PROCEDURE — 36415 COLL VENOUS BLD VENIPUNCTURE: CPT

## 2021-05-25 PROCEDURE — 80061 LIPID PANEL: CPT

## 2021-05-25 PROCEDURE — C1887 CATHETER, GUIDING: HCPCS

## 2021-05-25 PROCEDURE — 92978 ENDOLUMINL IVUS OCT C 1ST: CPT | Performed by: INTERNAL MEDICINE

## 2021-05-25 PROCEDURE — 93458 L HRT ARTERY/VENTRICLE ANGIO: CPT

## 2021-05-25 PROCEDURE — 2500000003 HC RX 250 WO HCPCS

## 2021-05-25 PROCEDURE — 93005 ELECTROCARDIOGRAM TRACING: CPT | Performed by: INTERNAL MEDICINE

## 2021-05-25 PROCEDURE — 85610 PROTHROMBIN TIME: CPT

## 2021-05-25 PROCEDURE — 2580000003 HC RX 258: Performed by: INTERNAL MEDICINE

## 2021-05-25 PROCEDURE — 92928 PRQ TCAT PLMT NTRAC ST 1 LES: CPT | Performed by: INTERNAL MEDICINE

## 2021-05-25 PROCEDURE — 6360000004 HC RX CONTRAST MEDICATION: Performed by: INTERNAL MEDICINE

## 2021-05-25 PROCEDURE — 2709999900 HC NON-CHARGEABLE SUPPLY

## 2021-05-25 PROCEDURE — C1769 GUIDE WIRE: HCPCS

## 2021-05-25 PROCEDURE — G0378 HOSPITAL OBSERVATION PER HR: HCPCS

## 2021-05-25 PROCEDURE — 85347 COAGULATION TIME ACTIVATED: CPT

## 2021-05-25 PROCEDURE — 027136Z DILATION OF CORONARY ARTERY, TWO ARTERIES WITH THREE DRUG-ELUTING INTRALUMINAL DEVICES, PERCUTANEOUS APPROACH: ICD-10-PCS | Performed by: INTERNAL MEDICINE

## 2021-05-25 PROCEDURE — 02703ZZ DILATION OF CORONARY ARTERY, ONE ARTERY, PERCUTANEOUS APPROACH: ICD-10-PCS | Performed by: INTERNAL MEDICINE

## 2021-05-25 RX ORDER — SODIUM CHLORIDE 9 MG/ML
25 INJECTION, SOLUTION INTRAVENOUS PRN
Status: DISCONTINUED | OUTPATIENT
Start: 2021-05-25 | End: 2021-05-26 | Stop reason: HOSPADM

## 2021-05-25 RX ORDER — MORPHINE SULFATE 2 MG/ML
2 INJECTION, SOLUTION INTRAMUSCULAR; INTRAVENOUS
Status: ACTIVE | OUTPATIENT
Start: 2021-05-25 | End: 2021-05-25

## 2021-05-25 RX ORDER — ATROPINE SULFATE 0.4 MG/ML
0.5 AMPUL (ML) INJECTION
Status: DISPENSED | OUTPATIENT
Start: 2021-05-25 | End: 2021-05-25

## 2021-05-25 RX ORDER — PRASUGREL 10 MG/1
10 TABLET, FILM COATED ORAL DAILY
Status: DISCONTINUED | OUTPATIENT
Start: 2021-05-26 | End: 2021-05-26

## 2021-05-25 RX ORDER — ASPIRIN 81 MG/1
81 TABLET, CHEWABLE ORAL DAILY
Status: DISCONTINUED | OUTPATIENT
Start: 2021-05-26 | End: 2021-05-26 | Stop reason: HOSPADM

## 2021-05-25 RX ORDER — LISINOPRIL 2.5 MG/1
2.5 TABLET ORAL DAILY
Status: DISCONTINUED | OUTPATIENT
Start: 2021-05-25 | End: 2021-05-26 | Stop reason: HOSPADM

## 2021-05-25 RX ORDER — EPTIFIBATIDE 0.75 MG/ML
2 INJECTION, SOLUTION INTRAVENOUS CONTINUOUS
Status: DISPENSED | OUTPATIENT
Start: 2021-05-25 | End: 2021-05-26

## 2021-05-25 RX ORDER — SODIUM CHLORIDE 9 MG/ML
INJECTION, SOLUTION INTRAVENOUS CONTINUOUS
Status: ACTIVE | OUTPATIENT
Start: 2021-05-25 | End: 2021-05-26

## 2021-05-25 RX ORDER — SODIUM CHLORIDE 0.9 % (FLUSH) 0.9 %
5-40 SYRINGE (ML) INJECTION EVERY 12 HOURS SCHEDULED
Status: DISCONTINUED | OUTPATIENT
Start: 2021-05-25 | End: 2021-05-26 | Stop reason: HOSPADM

## 2021-05-25 RX ORDER — SODIUM CHLORIDE 0.9 % (FLUSH) 0.9 %
5-40 SYRINGE (ML) INJECTION PRN
Status: DISCONTINUED | OUTPATIENT
Start: 2021-05-25 | End: 2021-05-26 | Stop reason: HOSPADM

## 2021-05-25 RX ORDER — ONDANSETRON 2 MG/ML
4 INJECTION INTRAMUSCULAR; INTRAVENOUS EVERY 6 HOURS PRN
Status: DISCONTINUED | OUTPATIENT
Start: 2021-05-25 | End: 2021-05-26 | Stop reason: HOSPADM

## 2021-05-25 RX ORDER — ACETAMINOPHEN 325 MG/1
650 TABLET ORAL EVERY 4 HOURS PRN
Status: DISCONTINUED | OUTPATIENT
Start: 2021-05-25 | End: 2021-05-26 | Stop reason: HOSPADM

## 2021-05-25 RX ORDER — ATORVASTATIN CALCIUM 40 MG/1
40 TABLET, FILM COATED ORAL NIGHTLY
Status: DISCONTINUED | OUTPATIENT
Start: 2021-05-25 | End: 2021-05-26 | Stop reason: HOSPADM

## 2021-05-25 RX ADMIN — IOHEXOL 250 ML: 350 INJECTION, SOLUTION INTRAVENOUS at 16:13

## 2021-05-25 RX ADMIN — SODIUM CHLORIDE: 9 INJECTION, SOLUTION INTRAVENOUS at 17:29

## 2021-05-25 RX ADMIN — ATORVASTATIN CALCIUM 40 MG: 40 TABLET, FILM COATED ORAL at 21:09

## 2021-05-25 RX ADMIN — EPTIFIBATIDE 2 MCG/KG/MIN: 75 INJECTION INTRAVENOUS at 22:48

## 2021-05-25 RX ADMIN — EPTIFIBATIDE 2 MCG/KG/MIN: 75 INJECTION INTRAVENOUS at 15:32

## 2021-05-25 RX ADMIN — LISINOPRIL 2.5 MG: 2.5 TABLET ORAL at 17:29

## 2021-05-25 RX ADMIN — Medication 10 ML: at 21:10

## 2021-05-25 RX ADMIN — METOPROLOL TARTRATE 25 MG: 25 TABLET, FILM COATED ORAL at 21:09

## 2021-05-25 ASSESSMENT — PAIN SCALES - GENERAL
PAINLEVEL_OUTOF10: 0
PAINLEVEL_OUTOF10: 0

## 2021-05-25 NOTE — PROGRESS NOTES
Pt admitted to room 4461 from cath lab. TR band in place to R wrist; site clean, dry, intact. Pt alert and oriented X4. Full assessment and vitals obtained (see doc flow sheets). Tele monitor placed on; NSR noted with HR 60's. Admission questions completed. Pt oriented to call light, room, and plan of care. Call light and belongings left within reach. No needs expressed at this time. Will continue to monitor wrist/vital checks per cath lab protocol.  Electronically signed by González Robles RN on 5/25/2021 at 4:45 PM

## 2021-05-25 NOTE — PROGRESS NOTES
4 Eyes Admission Assessment     I agree as the admission nurse that 2 RN's have performed a thorough Head to Toe Skin Assessment on the patient. ALL assessment sites listed below have been assessed on admission. Areas assessed by both nurses:   [x]   Head, Face, and Ears   [x]   Shoulders, Back, and Chest  [x]   Arms, Elbows, and Hands   [x]   Coccyx, Sacrum, and Ischium  [x]   Legs, Feet, and Heels        Does the Patient have Skin Breakdown?   No         Pedro Prevention initiated:  No   Wound Care Orders initiated:  No      [facility] nurse consulted for Pressure Injury (Stage 3,4, Unstageable, DTI, NWPT, and Complex wounds) or Pedro score 18 or lower:  No      Nurse 1 eSignature: Electronically signed by Silvia Price RN on 5/25/21 at 5:51 PM EDT    **SHARE this note so that the co-signing nurse is able to place an eSignature**    Nurse 2 eSignature: Electronically signed by Snow Perez RN on 5/25/21 at 5:51 PM EDT

## 2021-05-25 NOTE — PROCEDURES
[facility] [address]                                                LEFT HEART CATH    Calleen Chance   61 y.o., male  1960 5/25/2021    Procedure performed by Dr. China Rivera MD, [facility]  Surgical assistants :none    Procedure  Selective Coronary Angiography  Cardiac Catheterization for Coronary Anatomy  Left Heart Catheterization  Left Ventriculogram  Radial artery access assisted by ultrasound  Arterial Access Right Radial Artery after a negative Brooks test  Intravascular ultrasound on the right coronary artery   Intravascular ultrasound on the LAD  PTCA stent to the LAD mid vessel using drug-eluting stent  PTCA stent to the right coronary artery using drug-eluting stent  PTCA to the proximal aspect of the first large diagonal branch PO BA    TR Band    Indication:Cardiac cath to rule out ischemic CAD, Possible angioplasty, The procedure and risks described to patient including risk of CVA, MI, bleeding, emergency surgery, death, patient with previous interventions and recent increasing and angina. Positive stress nuclear study. , Consent signed or positive stress test  Unspecified Angina  Anesthesia: Moderate sedation with Versed and Fentanyl IV  Anesthesia Start time 2:05 PM  Anesthesia end time 3:48 PM  Estimated blood loss :minimal  Abnormal Stress Test      Procedure Description:  After written informed consent was obtained, the patient was   brought to the cardiac catheterization suite, where patient was prepped and   draped in the usual sterile fashion. Local anesthesia was achieved in the   right wrist with 2% lidocaine. A 5-Syriac hemostasis sheath was placed into   the right radial artery.     The pre cocktail of heparin, verapamil and nitroglycerin was injected into the sheath. A 5-Eritrean Bony catheter was introduced; used to engage the left main   coronary artery. Radiographic  images were obtained. The catheter was pulled back then rotated. The Bony catheter could not engage the right we then used a JL4 diagnostic catheter. Was introduced  to engage the right coronary   artery. Radiographic  images were obtained. The catheter was removed and exchanged over an exchange length 0.35 soft guide wire. The Bony catheter was then positioned in the left ventricle and LV gram performed by hand-injection. Left ventriculogram was performed. After these images were obtained. , the   catheter was flushed, pulled back across the aortic valve revealing no gradient. The catheter was removed. At this time we proceeded to evaluate the right coronary artery. We use a J are for diagnostic catheter. We then placed a BMW guidewire down the right coronary artery and then performed intravascular ultrasound finding and 85% stenosis. At this time we then placed a 3.25 x 18 mm drug-eluting stent into the right coronary artery. We then postdilated that stent with a 3.25 noncompliant balloon. The vessel open very nicely without compromise. At this time we approach to the LAD lesion. We used an XB 3.5 guide. We then placed a BMW guidewire down the LAD and followed with an intravascular ultrasound finding of 65% stenosis with a focal area of 90%. At this time we then placed a 3.0 x 18 mm drug-eluting stent. We then subsequently deployed the stent and later had to place another 2.75 x 18mm drug-eluting stents and deployed the stent. Vessel open very nicely and we were able to determine our stent sizes based on the ultrasound ultrasound. At this time we identify there was some narrowing in the diagonal branch just proximal to our stenting.   We were able to place a BMW guidewire down and and then we placed a 2.5 mm x 8 mm balloon to dilate the ostium of the Marian Redmond MD M.D., Trina Pepper; Nayla Cruz MD

## 2021-05-26 VITALS
OXYGEN SATURATION: 98 % | DIASTOLIC BLOOD PRESSURE: 65 MMHG | HEIGHT: 71 IN | BODY MASS INDEX: 23.52 KG/M2 | RESPIRATION RATE: 16 BRPM | SYSTOLIC BLOOD PRESSURE: 110 MMHG | HEART RATE: 58 BPM | WEIGHT: 168 LBS | TEMPERATURE: 97.8 F

## 2021-05-26 PROBLEM — R93.89 ABNORMAL ANGIOGRAM: Status: ACTIVE | Noted: 2021-05-26

## 2021-05-26 LAB
ANION GAP SERPL CALCULATED.3IONS-SCNC: 6 MMOL/L (ref 3–16)
BASOPHILS ABSOLUTE: 0 K/UL (ref 0–0.2)
BASOPHILS RELATIVE PERCENT: 0.2 %
BUN BLDV-MCNC: 17 MG/DL (ref 7–20)
CALCIUM SERPL-MCNC: 8.8 MG/DL (ref 8.3–10.6)
CHLORIDE BLD-SCNC: 102 MMOL/L (ref 99–110)
CHOLESTEROL, TOTAL: 135 MG/DL (ref 0–199)
CO2: 29 MMOL/L (ref 21–32)
CREAT SERPL-MCNC: 0.9 MG/DL (ref 0.8–1.3)
EKG ATRIAL RATE: 54 BPM
EKG DIAGNOSIS: NORMAL
EKG P AXIS: 58 DEGREES
EKG P-R INTERVAL: 186 MS
EKG Q-T INTERVAL: 414 MS
EKG QRS DURATION: 88 MS
EKG QTC CALCULATION (BAZETT): 392 MS
EKG R AXIS: 22 DEGREES
EKG T AXIS: 72 DEGREES
EKG VENTRICULAR RATE: 54 BPM
EOSINOPHILS ABSOLUTE: 0.2 K/UL (ref 0–0.6)
EOSINOPHILS RELATIVE PERCENT: 1.6 %
GFR AFRICAN AMERICAN: >60
GFR NON-AFRICAN AMERICAN: >60
GLUCOSE BLD-MCNC: 89 MG/DL (ref 70–99)
HCT VFR BLD CALC: 43.7 % (ref 40.5–52.5)
HDLC SERPL-MCNC: 37 MG/DL (ref 40–60)
HEMOGLOBIN: 15.3 G/DL (ref 13.5–17.5)
LDL CHOLESTEROL CALCULATED: 71 MG/DL
LYMPHOCYTES ABSOLUTE: 1.6 K/UL (ref 1–5.1)
LYMPHOCYTES RELATIVE PERCENT: 16.2 %
MCH RBC QN AUTO: 30.8 PG (ref 26–34)
MCHC RBC AUTO-ENTMCNC: 35 G/DL (ref 31–36)
MCV RBC AUTO: 88 FL (ref 80–100)
MONOCYTES ABSOLUTE: 0.6 K/UL (ref 0–1.3)
MONOCYTES RELATIVE PERCENT: 6.3 %
NEUTROPHILS ABSOLUTE: 7.7 K/UL (ref 1.7–7.7)
NEUTROPHILS RELATIVE PERCENT: 75.7 %
PDW BLD-RTO: 12.7 % (ref 12.4–15.4)
PLATELET # BLD: 194 K/UL (ref 135–450)
PMV BLD AUTO: 9.3 FL (ref 5–10.5)
POTASSIUM REFLEX MAGNESIUM: 4.4 MMOL/L (ref 3.5–5.1)
RBC # BLD: 4.97 M/UL (ref 4.2–5.9)
SODIUM BLD-SCNC: 137 MMOL/L (ref 136–145)
TRIGL SERPL-MCNC: 136 MG/DL (ref 0–150)
VLDLC SERPL CALC-MCNC: 27 MG/DL
WBC # BLD: 10.1 K/UL (ref 4–11)

## 2021-05-26 PROCEDURE — 93005 ELECTROCARDIOGRAM TRACING: CPT | Performed by: INTERNAL MEDICINE

## 2021-05-26 PROCEDURE — 36415 COLL VENOUS BLD VENIPUNCTURE: CPT

## 2021-05-26 PROCEDURE — G0378 HOSPITAL OBSERVATION PER HR: HCPCS

## 2021-05-26 PROCEDURE — 99233 SBSQ HOSP IP/OBS HIGH 50: CPT | Performed by: NURSE PRACTITIONER

## 2021-05-26 PROCEDURE — 93010 ELECTROCARDIOGRAM REPORT: CPT | Performed by: INTERNAL MEDICINE

## 2021-05-26 PROCEDURE — 94761 N-INVAS EAR/PLS OXIMETRY MLT: CPT

## 2021-05-26 PROCEDURE — 2580000003 HC RX 258: Performed by: INTERNAL MEDICINE

## 2021-05-26 PROCEDURE — 94760 N-INVAS EAR/PLS OXIMETRY 1: CPT

## 2021-05-26 PROCEDURE — 6370000000 HC RX 637 (ALT 250 FOR IP): Performed by: INTERNAL MEDICINE

## 2021-05-26 PROCEDURE — 80048 BASIC METABOLIC PNL TOTAL CA: CPT

## 2021-05-26 PROCEDURE — 85025 COMPLETE CBC W/AUTO DIFF WBC: CPT

## 2021-05-26 RX ORDER — CLOPIDOGREL BISULFATE 75 MG/1
75 TABLET ORAL DAILY
Status: DISCONTINUED | OUTPATIENT
Start: 2021-05-26 | End: 2021-05-26 | Stop reason: HOSPADM

## 2021-05-26 RX ADMIN — LISINOPRIL 2.5 MG: 2.5 TABLET ORAL at 08:19

## 2021-05-26 RX ADMIN — METOPROLOL TARTRATE 25 MG: 25 TABLET, FILM COATED ORAL at 08:19

## 2021-05-26 RX ADMIN — Medication 10 ML: at 08:21

## 2021-05-26 RX ADMIN — ASPIRIN 81 MG: 81 TABLET, CHEWABLE ORAL at 08:19

## 2021-05-26 RX ADMIN — PRASUGREL 10 MG: 10 TABLET, FILM COATED ORAL at 08:18

## 2021-05-26 ASSESSMENT — PAIN SCALES - GENERAL
PAINLEVEL_OUTOF10: 0

## 2021-05-26 ASSESSMENT — PAIN SCALES - WONG BAKER: WONGBAKER_NUMERICALRESPONSE: 0

## 2021-05-26 NOTE — DISCHARGE SUMMARY
Patient ID:  Tavares Naylor  [id number]  89 y.o.  1960    Admit date: 5/25/2021    Discharge date and time: 5/26/2021    Admitting Physician: Navdeep Baird MD       Admission Diagnoses: Ischemic cardiomyopathy [I25.5]  Coronary artery disease involving autologous artery coronary bypass graft with angina pectoris [facility]) [I25.729]    Discharge Diagnoses: SAME    Admission Condition: fair    Discharged Condition: good    Hospital Course:     HPI: Tavares Naylor is a 61 y.o. male with a past medical history of CAD and post stenting on December 12/22/14  // neg stress 2016  / on GDMT for CAD    stress test which is abnormal,  completed for atypical cp and palpitations wit hx of CAD  HTN HLD anxiety    zio worn for palpitations and feeling of anxiety state related to work      Stress test: 5/5/2021    Overall findings represent a intermediate risk scan.    Normal LV size and systolic function.    Predominant reversible defect involving the basal and mid inferolateral    cardiac segments.    Findings are concerning for ischemia.    ECG: Normal EKG response.         [facility] 5/25/2021 PTCA stent to the LAD mid vessel using drug-eluting stent  PTCA stent to the right coronary artery using drug-eluting stent  PTCA to the proximal aspect of the first large diagonal branch PO BA     Interval Hx: Today, he is resting quietly  / in NSR VSS on RA SV02 98%   Right wrist no complications   No new complaints today. No major events overnight.    Denies having chest pain, palpitations, shortness of breath, orthopnea/PND, cough, or dizziness at the time of this visit.       Discharge Exam:  /72   Pulse 60   Temp 97.8 °F (36.6 °C) (Oral)   Resp 16   Ht 5' 11\" (1.803 m)   Wt 168 lb (76.2 kg)   SpO2 98%   BMI 23.43 kg/m²     General Appearance:    Alert, cooperative, no distress, appears stated age   Head:    Normocephalic, without obvious abnormality, atraumatic   Eyes:    PERRL, conjunctiva/corneas clear, EOM's intact Ears:    deferred   Nose:   Nares normal, septum midline, mucosa normal, no drainage    or sinus tenderness   Throat:   Lips, mucosa, and tongue normal; teeth and gums normal   Neck:   Supple, symmetrical, trachea midline, no adenopathy;        thyroid:  No enlargement/tenderness/nodules; no carotid    bruit or JVD   Back:     Symmetric, no curvature, ROM normal, no CVA tenderness   Lungs:     Clear to auscultation bilaterally, respirations unlabored   Chest wall:    No tenderness or deformity   Heart:    Regular rate and rhythm, S1 and S2 normal, no murmur, rub   or gallop   Abdomen:     Soft, non-tender, bowel sounds active all four quadrants,     no masses, no organomegaly   Genitalia:    deferred   Rectal:    deferred    Extremities:   Extremities normal, atraumatic, no cyanosis or edema   Pulses:   2+ and symmetric all extremities   Skin:   Skin color, texture, turgor normal, no rashes or lesions   Lymph nodes:   Cervical, supraclavicular, and axillary nodes normal   Neurologic:   CNII-XII intact. Normal strength, sensation and reflexes       throughout     Disposition: home    Patient Instructions:   Current Discharge Medication List      START taking these medications    Details   metoprolol tartrate (LOPRESSOR) 25 MG tablet Take 1 tablet by mouth 2 times daily  Qty: 60 tablet, Refills: 3         CONTINUE these medications which have NOT CHANGED    Details   Multiple Vitamins-Minerals (CENTRUM ADULTS PO) Take 1 tablet by mouth daily      clopidogrel (PLAVIX) 75 MG tablet Take 1 tablet by mouth daily  Qty: 90 tablet, Refills: 3    Associated Diagnoses: Coronary artery disease involving native coronary artery of native heart without angina pectoris      atorvastatin (LIPITOR) 20 MG tablet TAKE 1 TABLET BY MOUTH DAILY  Qty: 90 tablet, Refills: 3      aspirin 81 MG chewable tablet Take 1 tablet by mouth daily.   Qty: 30 tablet, Refills: 3      sildenafil (VIAGRA) 50 MG tablet Take 1 tablet by mouth as needed for Erectile Dysfunction. Qty: 30 tablet, Refills: 3         STOP taking these medications       pantoprazole (PROTONIX) 40 MG tablet Comments:   Reason for Stopping:                Activity: as tolerated  Diet: cardiac diet    a beta-blocker  a statin  Dual on antiplatelet therapy      Follow-up in office approx 1 week    Time spent on discharge of patient: - minutes, including plan of care, patient education, and care coordination.      >29 minutes with pt and notes medicines and discharge

## 2021-05-26 NOTE — PROGRESS NOTES
[facility]   Cardiology    Dr Malka Nash MD, [facility] FNP APRN CVNP  Date: 5/26/2021  Admit Date: 5/25/2021         CC:CAD with abnormal  stress test for atypical cp     History obtained from patient and medical record. Primary cardiologist:  Aj Jewell     HPI: Osman Uriostegui is a 61 y.o. male with a past medical history of CAD and post stenting on December 12/22/14  // neg stress 2016  / on GDMT for CAD    stress test which is abnormal,  completed for atypical cp and palpitations wit hx of CAD  HTN HLD anxiety    zio worn for palpitations and feeling of anxiety state related to work     Stress test: 5/5/2021    Overall findings represent a intermediate risk scan.    Normal LV size and systolic function.    Predominant reversible defect involving the basal and mid inferolateral    cardiac segments.    Findings are concerning for ischemia.    ECG: Normal EKG response. [facility] 5/25/2021 PTCA stent to the LAD mid vessel using drug-eluting stent  PTCA stent to the right coronary artery using drug-eluting stent  PTCA to the proximal aspect of the first large diagonal branch PO BA    Interval Hx: Today, he is resting quietly  / in NSR VSS on RA SV02 98%   Right wrist no complications   No new complaints today. No major events overnight. Denies having chest pain, palpitations, shortness of breath, orthopnea/PND, cough, or dizziness at the time of this visit. Patient seen and examined. Clinical notes reviewed. Telemetry reviewed / testing reviewed  >29 minutes   Pertinent labs, diagnostic, device, and imaging results reviewed as a part of this visit  EKG TTE stress test: any LHC/RHC reviewed     Past Medical History:  Past Medical History:   Diagnosis Date    CAD (coronary artery disease)         Past Surgical History:    has a past surgical history that includes Coronary angioplasty ( 12/23/14). Social History:  Reviewed. reports that he has never smoked.  He has never used smokeless tobacco. He reports that he does not drink alcohol and does not use drugs. Allergies: Allergies   Allergen Reactions    Codeine Nausea Only    Morphine And Related        Family History:  Reviewed. family history includes Coronary Art Dis in his father; No Known Problems in his sister and sister; Stroke in his mother. Denies family history of sudden cardiac death, arrhythmia, premature CAD    Home Meds:  Prior to Visit Medications    Medication Sig Taking? Authorizing Provider   Multiple Vitamins-Minerals (CENTRUM ADULTS PO) Take 1 tablet by mouth daily Yes Historical Provider, MD   clopidogrel (PLAVIX) 75 MG tablet Take 1 tablet by mouth daily Yes Veronica Deutsch, APRN - CNP   atorvastatin (LIPITOR) 20 MG tablet TAKE 1 TABLET BY MOUTH DAILY Yes James Howard MD   aspirin 81 MG chewable tablet Take 1 tablet by mouth daily. Yes Roxanne Nichols MD   sildenafil (VIAGRA) 50 MG tablet Take 1 tablet by mouth as needed for Erectile Dysfunction.   James Howard MD        Scheduled Meds:   sodium chloride flush  5-40 mL Intravenous 2 times per day    atorvastatin  40 mg Oral Nightly    aspirin  81 mg Oral Daily    metoprolol tartrate  25 mg Oral BID    lisinopril  2.5 mg Oral Daily    prasugrel  10 mg Oral Daily     Continuous Infusions:   sodium chloride       PRN Meds:sodium chloride flush, sodium chloride, acetaminophen, ondansetron     Review of Systems:  · Constitutional: Negative for fever, night sweats, chills, weight changes  · Skin: Negative for rash, pruritus, bleeding, blood clots, or bruising    · HEENT: Negative for vision changes, ringing in the ears, dysphagia, or swollen lymph nodes  · Respiratory: Reviewed in HPI  · Cardiovascular: Reviewed in HPI  · Gastrointestinal: Negative for abdominal pain, N/V/D, constipation, or black/tarry stools  · Genito-Urinary: Negative for dysuria, incontinence, or hematuria  · Musculoskeletal: Negative for joint swelling, muscle pain, or injuries  · Neurological/Psych: Negative for confusion, seizures, headaches, or TIA-like symptoms. No anxiety or depression. Physical Examination:  Vitals:    05/26/21 0816   BP: 118/72   Pulse: 60   Resp: 16   Temp: 97.8 °F (36.6 °C)   SpO2: 98%      In: 1200.8 [P.O.:240; I.V.:960.8]  Out: 0    Wt Readings from Last 3 Encounters:   05/25/21 168 lb (76.2 kg)   05/19/21 174 lb (78.9 kg)   04/26/21 174 lb (78.9 kg)       Intake/Output Summary (Last 24 hours) at 5/26/2021 0854  Last data filed at 5/26/2021 0400  Gross per 24 hour   Intake 1200.84 ml   Output 0 ml   Net 1200.84 ml       Telemetry: Personally Reviewed    · Constitutional: Cooperative and in no apparent distress, and appears well nourished  · Skin: Warm and pink; no pallor, cyanosis, clubbing, or bruising   · HEENT: Symmetric and normocephalic. PERRL, EOM intact. Conjunctiva pink with clear sclera. Mucus membranes moist.   · Cardiovascular: Regular rate and rhythm. S1/S2 present without murmurs, no rubs or gallops. Peripheral pulses 2+, capillary refill < 3 seconds. No elevation of JVP. No peripheral edema  · Respiratory: Respirations symmetric and unlabored. Lungs clear to auscultation bilaterally, no wheezing, crackles, or rhonchi  · Gastrointestinal: Abdomen soft and round. Bowel sounds normoactive without tenderness or masses. · Musculoskeletal: Bilateral upper and lower extremity strength 5/5 with full ROM  · Neurologic/Psych: Awake and orientated to person, place and time. Calm affect, appropriate mood      BMP:   Recent Labs     05/25/21  1229      K 3.9      CO2 26   BUN 16   CREATININE 1.1     Estimated Creatinine Clearance: 76 mL/min (based on SCr of 1.1 mg/dL).    CBC:   Recent Labs     05/25/21  1229   WBC 11.2*   HGB 16.5   HCT 48.4   MCV 88.7        Thyroid:   Lab Results   Component Value Date    TSH 1.57 07/06/2020     Lipids:   Lab Results   Component Value Date    CHOL 135 05/25/2021    HDL 37 05/25/2021    TRIG 136 05/25/2021     LFTS:   Lab Results   Component Value Date    ALT 22 05/25/2021    AST 19 05/25/2021    ALKPHOS 95 05/25/2021    PROT 7.6 05/25/2021    AGRATIO 1.2 05/25/2021    BILITOT 0.5 05/25/2021     Cardiac Enzymes:   Lab Results   Component Value Date    TROPONINI 2.56 12/23/2014    TROPONINI 3.07 12/23/2014    TROPONINI <0.01 12/22/2014     Coags:   Lab Results   Component Value Date    PROTIME 12.6 05/25/2021    INR 1.09 05/25/2021       Patient Active Problem List    Diagnosis Date Noted    CAD (coronary artery disease) 12/30/2014    S/P angioplasty with stent 12/30/2014    Chest pain     Coronary artery disease involving autologous artery coronary bypass graft with angina pectoris ([facility] [address] 05/25/2021        Assessment     CAD and post stenting on December 12/22/14  // neg stress 2016  / on GDMT for CAD    stress test which is abnormal,  completed for atypical cp and palpitations wit hx of CAD  HTN HLD anxiety    zio worn for palpitations and feeling of anxiety state related to work     Stress test: 5/5/2021    Overall findings represent a intermediate risk scan.    Normal LV size and systolic function.    Predominant reversible defect involving the basal and mid inferolateral    cardiac segments.    Findings are concerning for ischemia.    ECG: Normal EKG response. [facility] 5/25/2021 PTCA stent to the LAD mid vessel using drug-eluting stent  PTCA stent to the right coronary artery using drug-eluting stent  PTCA to the proximal aspect of the first large diagonal branch PO BA   Left ventriculogram shows ejection fraction of 65%. Normal wall motion. HLD  On statin   LDL 71 prefer <70 / near optimal     Plan   Discharge today  on GDMT  cardiac rehab discussed    Fu with me in approx a week     Thank you for allowing to us to participate in the care of Ami Regina Hernandez APRN-CNP-CVNP    [facility]

## 2021-05-26 NOTE — PLAN OF CARE
Patient discharged with no puncture site complications. Understood discharge instructions.    Problem: Cardiac Output - Decreased:  Goal: Hemodynamic stability will improve  Description: Hemodynamic stability will improve  5/26/2021 1802 by Maynor Botello RN  Outcome: Completed

## 2021-06-04 ENCOUNTER — OFFICE VISIT (OUTPATIENT)
Dept: CARDIOLOGY CLINIC | Age: 61
End: 2021-06-04
Payer: COMMERCIAL

## 2021-06-04 VITALS
DIASTOLIC BLOOD PRESSURE: 78 MMHG | BODY MASS INDEX: 24.22 KG/M2 | WEIGHT: 173 LBS | HEART RATE: 62 BPM | HEIGHT: 71 IN | SYSTOLIC BLOOD PRESSURE: 108 MMHG

## 2021-06-04 DIAGNOSIS — E78.5 HYPERLIPIDEMIA, UNSPECIFIED HYPERLIPIDEMIA TYPE: ICD-10-CM

## 2021-06-04 DIAGNOSIS — R93.89 ABNORMAL ANGIOGRAM: ICD-10-CM

## 2021-06-04 DIAGNOSIS — I25.729 CORONARY ARTERY DISEASE INVOLVING AUTOLOGOUS ARTERY CORONARY BYPASS GRAFT WITH ANGINA PECTORIS (HCC): Primary | ICD-10-CM

## 2021-06-04 PROCEDURE — 99214 OFFICE O/P EST MOD 30 MIN: CPT | Performed by: INTERNAL MEDICINE

## 2021-06-04 RX ORDER — ATORVASTATIN CALCIUM 80 MG/1
80 TABLET, FILM COATED ORAL DAILY
Qty: 90 TABLET | Refills: 3 | Status: SHIPPED | OUTPATIENT
Start: 2021-06-04 | End: 2022-06-08

## 2021-06-04 NOTE — PROGRESS NOTES
[address]   Dr Jessica Green. Kanu Tatum MD, [facility]  Outpatient Follow Up Note         06/04/21  HPI:  Smith Floyd is 61 y.o. male who presents today for follow-up after recent cath and stenting on May 25, 2021. He is feeling significantly better. Able to move. Going back to work. We did have to provide stenting as noted below. His LDL is at 71 I think we need to tweak that down further. We will increase Lipitor to 80 mg/day. Repeat lipids and CMP in about 2 months. He will be taking co-Q10 supplementation. Cath 5/25/21PTCA stent to the LAD mid vessel using drug-eluting stent  PTCA stent to the right coronary artery using drug-eluting stent  PTCA to the proximal aspect of the first large diagonal branch PO BA     CAD with stents December 22, 2014  Hypertension  Hyperlipidemia   Past Medical History:   Diagnosis Date    CAD (coronary artery disease)      PSH  Past Surgical History:   Procedure Laterality Date    CORONARY ANGIOPLASTY   12/23/14    stent mid circ     SH:       Social History     Tobacco Use   Smoking Status Never Smoker   Smokeless Tobacco Never Used     Current Medications:  Prior to Visit Medications    Medication Sig Taking? Authorizing Provider   metoprolol tartrate (LOPRESSOR) 25 MG tablet Take 1 tablet by mouth 2 times daily Yes Stacy Latif APRN - CNP   Multiple Vitamins-Minerals (CENTRUM ADULTS PO) Take 1 tablet by mouth daily Yes Historical Provider, MD   clopidogrel (PLAVIX) 75 MG tablet Take 1 tablet by mouth daily Yes Stacy Latif APRN - CNP   atorvastatin (LIPITOR) 20 MG tablet TAKE 1 TABLET BY MOUTH DAILY Yes Negrita Perez MD   sildenafil (VIAGRA) 50 MG tablet Take 1 tablet by mouth as needed for Erectile Dysfunction. Yes Negrita Perez MD   aspirin 81 MG chewable tablet Take 1 tablet by mouth daily.  Yes Haritha Winslow MD     Family History  Family History   Problem Relation Age of Onset    Stroke Mother     Coronary Art Dis Father     No Known Problems Sister     No Known Problems Sister           · ROS:   · Cardiovascular: Reviewed in HPI  · Allergic/Immunologic: No nasal congestion or hives. · All other ROS are reviewed and are unremarkable. PHYSICAL EXAM:      Wt Readings from Last 3 Encounters:   06/04/21 173 lb (78.5 kg)   05/25/21 168 lb (76.2 kg)   05/19/21 174 lb (78.9 kg)       BP Readings from Last 3 Encounters:   06/04/21 108/78   05/26/21 110/65   05/19/21 94/60       Pulse Readings from Last 3 Encounters:   06/04/21 62   05/26/21 58   05/19/21 72     EKG on 7/14/2020 sinus rhythm 58/min. Otherwise normal study. Constitutional: This patient is oriented to person, place, and time. Also appears well-developed and well-nourished and in no acute distress. HEENT: Normocephalic and atraumatic. Sclerae anicteric. No xanthelasmas. Conjunctiva white, no subconjunctival hemorrhage   External inspection of ears nose teeth & gums   Eyes:PERRLA EOM's intact. Neck: Neck supple. No JVD present. Carotids without bruits. No mass and no thyromegaly. No lymphadenopathy    Cardiovascular: RRR, normal S1 and S2; no murmur/gallop or rub, PMI nondisplaced  Pulmonary/Chest: Effort normal.  Lungs clear to auscultation. Chest wall nontender  Abdominal: soft, nontender, nondistended. + bowel sounds; no organomegaly or bruits. Aorta normal  Extremities: No edema  Pulses are 2+ radial/carotid/dorsalis pedis bilaterally. Cap refill brisk. Neurological: No cranial nerve deficit. Psychiatric: This patient has a normal mood and affect and the speech is normal as is behavior    Assessment:     stable CAD  cath and PTCA  In the cx 12/22/2014 /  HTN   no c/o cp SOB edema / denies PND / denies JOHNIE  Recent hernia repair / doing well     HLD  LDL optimal   On Lipitor      Plan:   Increase Lipitor to 80 mg/day. Co-Q10 supplementation. Lipid and CMP in 2 months. Return to see me in 3 months. Hermilo Smith MD, [facility]

## 2021-06-18 PROCEDURE — 93248 EXT ECG>7D<15D REV&INTERPJ: CPT | Performed by: INTERNAL MEDICINE

## 2021-06-23 DIAGNOSIS — R00.2 HEART PALPITATIONS: ICD-10-CM

## 2021-06-24 ENCOUNTER — TELEPHONE (OUTPATIENT)
Dept: CARDIOLOGY CLINIC | Age: 61
End: 2021-06-24

## 2021-07-02 NOTE — H&P
[facility]    Cardiology Consult/H&P  Consulting Cardiologist Jose Mosher MD, M.D., [facility] [place]  Referring Provider:  Candie Tong MD    7/2/2021, 9:05 AM          Primary Cardiologist: Dr. Vish Suarez  Asked by Jose Mosher MD/Edwin Rodriguez MD to evaluate and assess this patient's abnormal stress test and chest discomfort    History of Present Illness: This should be a history and physical related to admission on 5/25/2021. Severiano Starring is a 61 y.o. male is patient with known CAD. Had outpatient stress test that was abnormal and positive. Previous angioplasty stents in 2014. Stress test was positive. Brought at this time on 5/25/2021 for cardiac cath and possible intervention. Past Medical History:   has a past medical history of CAD (coronary artery disease). Surgical History:   has a past surgical history that includes Coronary angioplasty ( 12/23/14). Social History:   reports that he has never smoked. He has never used smokeless tobacco. He reports that he does not drink alcohol and does not use drugs. Family History:  family history includes Coronary Art Dis in his father; No Known Problems in his sister and sister; Stroke in his mother. Home Medications:  Prior to Admission medications    Medication Sig Start Date End Date Taking? Authorizing Provider   metoprolol tartrate (LOPRESSOR) 25 MG tablet Take 1 tablet by mouth 2 times daily 5/26/21  Yes Tona Toledo APRN - CNP   Multiple Vitamins-Minerals (CENTRUM ADULTS PO) Take 1 tablet by mouth daily   Yes Historical Provider, MD   clopidogrel (PLAVIX) 75 MG tablet Take 1 tablet by mouth daily 5/19/21  Yes Tona Toledo APRN - CNP   aspirin 81 MG chewable tablet Take 1 tablet by mouth daily.  12/24/14  Yes Abdi Caba MD   atorvastatin (LIPITOR) 80 MG tablet Take 1 tablet by mouth daily 6/4/21   Jose Mosher MD   sildenafil (VIAGRA) 50 MG tablet Take 1 tablet by mouth as Value Date    TRIG 136 05/25/2021    HDL 37 05/25/2021    LDLCALC 71 05/25/2021    LABVLDL 27 05/25/2021       EKG: Sinus rhythm 75. Nonspecific ST and T wave changes. echocardiogram as noted assessment/ Plan     Patient Active Problem List    Diagnosis Date Noted    CAD (coronary artery disease) 12/30/2014    S/P angioplasty with stent 12/30/2014    Chest pain     Abnormal angiogram 05/26/2021    Coronary artery disease involving autologous artery coronary bypass graft with angina pectoris ([facility] [address] 05/25/2021   Patient with a history of angioplasty and stenting in 2014. Brought in on 525 for cardiac catheterization because of an abnormal and positive coronary ischemic stress test.  Results are as noted. Thanks for allowing me the opportunity  to participate in the evaluation and care of your patients.  Please call if we can assist further [phone]    This chart was likely completed using voice recognition technology and may contain unintended grammatical , phraseology,and/or punctuation errors  Yvonne Wright MD, M.PORSHA., [facility] [place]  7/2/20219:05 AM

## 2021-07-12 NOTE — TELEPHONE ENCOUNTER
Will have Ellie Ragland call and write a letter to [employer] to get the stent placement approved through the insurance.

## 2021-09-09 ENCOUNTER — OFFICE VISIT (OUTPATIENT)
Dept: CARDIOLOGY CLINIC | Age: 61
End: 2021-09-09
Payer: COMMERCIAL

## 2021-09-09 VITALS
BODY MASS INDEX: 24.41 KG/M2 | HEART RATE: 64 BPM | SYSTOLIC BLOOD PRESSURE: 110 MMHG | WEIGHT: 175 LBS | DIASTOLIC BLOOD PRESSURE: 78 MMHG

## 2021-09-09 DIAGNOSIS — I25.10 CORONARY ARTERY DISEASE INVOLVING NATIVE CORONARY ARTERY OF NATIVE HEART WITHOUT ANGINA PECTORIS: ICD-10-CM

## 2021-09-09 DIAGNOSIS — E78.5 HYPERLIPIDEMIA, UNSPECIFIED HYPERLIPIDEMIA TYPE: ICD-10-CM

## 2021-09-09 DIAGNOSIS — I25.729 CORONARY ARTERY DISEASE INVOLVING AUTOLOGOUS ARTERY CORONARY BYPASS GRAFT WITH ANGINA PECTORIS (HCC): Primary | ICD-10-CM

## 2021-09-09 PROCEDURE — 99214 OFFICE O/P EST MOD 30 MIN: CPT | Performed by: INTERNAL MEDICINE

## 2021-09-09 NOTE — PROGRESS NOTES
ArvinDougie Gutierrez. Ben Maxwell MD, [facility]  Outpatient Follow Up Note         09/09/21  HPI:  Dawn Morales is 61 y.o. male who presents today for follow-up after recent cath and stenting on May 25, 2021. He is doing fine and tolerating the big doses of Lipitor at 80 mg/day. His last LDL was 58. Overall I think he is doing well. Is working with data processing and data input marketing. Coronavirus vaccine Alvin last in April and awaiting a booster when it becomes available. Cath 5/25/21PTCA stent to the LAD mid vessel using drug-eluting stent  PTCA stent to the right coronary artery using drug-eluting stent  PTCA to the proximal aspect of the first large diagonal branch PO BA     CAD with stents December 22, 2014  Hypertension  Hyperlipidemia   Past Medical History:   Diagnosis Date    CAD (coronary artery disease)      PSH  Past Surgical History:   Procedure Laterality Date    CORONARY ANGIOPLASTY   12/23/14    stent mid circ     SH:       Social History     Tobacco Use   Smoking Status Never Smoker   Smokeless Tobacco Never Used     Current Medications:  Prior to Visit Medications    Medication Sig Taking? Authorizing Provider   atorvastatin (LIPITOR) 80 MG tablet Take 1 tablet by mouth daily Yes Gulshan Craft MD   metoprolol tartrate (LOPRESSOR) 25 MG tablet Take 1 tablet by mouth 2 times daily Yes HEATHER Castro CNP   Multiple Vitamins-Minerals (CENTRUM ADULTS PO) Take 1 tablet by mouth daily Yes Historical Provider, MD   clopidogrel (PLAVIX) 75 MG tablet Take 1 tablet by mouth daily Yes HEATHER Castro CNP   sildenafil (VIAGRA) 50 MG tablet Take 1 tablet by mouth as needed for Erectile Dysfunction. Yes Gulshan Craft MD   aspirin 81 MG chewable tablet Take 1 tablet by mouth daily.  Yes Denis Krabbe, MD     Family History  Family History   Problem Relation Age of Onset    Stroke Mother     Coronary Art Dis Father     No Known Problems Sister     No Known Problems Sister           · ROS:   · Cardiovascular: Reviewed in HPI  · Allergic/Immunologic: No nasal congestion or hives. · All other ROS are reviewed and are unremarkable. PHYSICAL EXAM:      Wt Readings from Last 3 Encounters:   09/09/21 175 lb (79.4 kg)   06/04/21 173 lb (78.5 kg)   05/25/21 168 lb (76.2 kg)       BP Readings from Last 3 Encounters:   09/09/21 110/78   06/04/21 108/78   05/26/21 110/65       Pulse Readings from Last 3 Encounters:   09/09/21 64   06/04/21 62   05/26/21 58     EKG on 7/14/2020 sinus rhythm 58/min. Otherwise normal study. Constitutional: This patient is oriented to person, place, and time. Also appears well-developed and well-nourished and in no acute distress. HEENT: Normocephalic and atraumatic. Sclerae anicteric. No xanthelasmas. Conjunctiva white, no subconjunctival hemorrhage   External inspection of ears nose teeth & gums   Eyes:PERRLA EOM's intact. Neck: Neck supple. No JVD present. Carotids without bruits. No mass and no thyromegaly. No lymphadenopathy    Cardiovascular: RRR, normal S1 and S2; no murmur/gallop or rub, PMI nondisplaced  Pulmonary/Chest: Effort normal.  Lungs clear to auscultation. Chest wall nontender  Abdominal: soft, nontender, nondistended. + bowel sounds; no organomegaly or bruits. Aorta normal  Extremities: No edema  Pulses are 2+ radial/carotid/dorsalis pedis bilaterally. Cap refill brisk. Neurological: No cranial nerve deficit. Psychiatric: This patient has a normal mood and affect and the speech is normal as is behavior    Assessment:     stable CAD  cath and PTCA  In the cx 12/22/2014 /  HTN   no c/o cp SOB edema / denies PND / denies JOHNIE  Recent hernia repair / doing well     HLD  LDL optimal   On Lipitor      Plan:   Continue current therapy. Continue Lipitor and I advised him on things to watch for as far as potential toxicity is concerned.  We will get a fasting lipid and CMP when he returns in 6 months. Also he is interested in his ABO blood type and we will plan to do so on next blood draw. Mauri Fernando MD, [facility] [place] [facility]

## 2021-12-14 DIAGNOSIS — I25.10 CORONARY ARTERY DISEASE INVOLVING NATIVE CORONARY ARTERY OF NATIVE HEART WITHOUT ANGINA PECTORIS: ICD-10-CM

## 2021-12-14 RX ORDER — CLOPIDOGREL BISULFATE 75 MG/1
TABLET ORAL
Qty: 90 TABLET | Refills: 2 | Status: SHIPPED | OUTPATIENT
Start: 2021-12-14 | End: 2022-09-09

## 2022-02-23 ENCOUNTER — OFFICE VISIT (OUTPATIENT)
Dept: CARDIOLOGY CLINIC | Age: 62
End: 2022-02-23
Payer: COMMERCIAL

## 2022-02-23 VITALS
SYSTOLIC BLOOD PRESSURE: 100 MMHG | DIASTOLIC BLOOD PRESSURE: 60 MMHG | BODY MASS INDEX: 24.41 KG/M2 | HEART RATE: 69 BPM | WEIGHT: 175 LBS

## 2022-02-23 DIAGNOSIS — I25.10 CORONARY ARTERY DISEASE DUE TO LIPID RICH PLAQUE: ICD-10-CM

## 2022-02-23 DIAGNOSIS — I25.83 CORONARY ARTERY DISEASE DUE TO LIPID RICH PLAQUE: ICD-10-CM

## 2022-02-23 DIAGNOSIS — R07.9 CHEST PAIN, UNSPECIFIED TYPE: Primary | ICD-10-CM

## 2022-02-23 PROCEDURE — 93000 ELECTROCARDIOGRAM COMPLETE: CPT | Performed by: NURSE PRACTITIONER

## 2022-02-23 PROCEDURE — 99214 OFFICE O/P EST MOD 30 MIN: CPT | Performed by: NURSE PRACTITIONER

## 2022-02-23 NOTE — PROGRESS NOTES
[address]   Dr Keya Hsu. Eulogio Juan MD, [address] [facility]  Outpatient Follow Up Note       02/23/22  HPI:  Ck Mahoney is 64 y.o. male who presents today for follow-up with CAD  stenting on May 25, 2021. Coronavirus vaccine x 3   [facility]  5/25/21 PTCA stent to the LAD mid vessel using drug-eluting stent  PTCA stent to the right coronary artery using drug-eluting stent  PTCA to the proximal aspect of the first large diagonal branch PO BA     stents December 22, 2014  HTN wnl  HLD LDL 62     Today c/o atypical chest discomfort to touch? feels better with activity and on treadmill  No c/o SOB /fever cough/ no n/v diarrhea /  Not  worse with deep breaths VSS wt stable   c/o occas GERD feeling / try PPI / recommend GI consult if continues    Consider NSAID or tylenol couple doses   discussed at great length / much emotional support given   On GDMT for CAD      EKG today        Past Medical History:   Diagnosis Date    CAD (coronary artery disease)      PSH  Past Surgical History:   Procedure Laterality Date    CORONARY ANGIOPLASTY   12/23/14    stent mid circ     SH:       Social History     Tobacco Use   Smoking Status Never Smoker   Smokeless Tobacco Never Used     Current Medications:  Prior to Visit Medications    Medication Sig Taking? Authorizing Provider   clopidogrel (PLAVIX) 75 MG tablet TAKE 1 TABLET BY MOUTH EVERY DAY Yes HEATHER Raymundo CNP   metoprolol tartrate (LOPRESSOR) 25 MG tablet TAKE 1 TABLET BY MOUTH 2 TIMES DAILY Yes HEATHER Raymundo CNP   atorvastatin (LIPITOR) 80 MG tablet Take 1 tablet by mouth daily Yes Aline Davidson MD   Multiple Vitamins-Minerals (CENTRUM ADULTS PO) Take 1 tablet by mouth daily Yes Historical Provider, MD   sildenafil (VIAGRA) 50 MG tablet Take 1 tablet by mouth as needed for Erectile Dysfunction. Yes Aline Davidson MD   aspirin 81 MG chewable tablet Take 1 tablet by mouth daily.  Yes Olivier Frederick MD     Family History  Family History   Problem Relation Age of Onset    Stroke Mother     Coronary Art Dis Father     No Known Problems Sister     No Known Problems Sister           · ROS:   · Cardiovascular: Reviewed in HPI  · Allergic/Immunologic: No nasal congestion or hives. · All other ROS are reviewed and are unremarkable. PHYSICAL EXAM:      Wt Readings from Last 3 Encounters:   02/23/22 175 lb (79.4 kg)   09/09/21 175 lb (79.4 kg)   06/04/21 173 lb (78.5 kg)       BP Readings from Last 3 Encounters:   02/23/22 100/60   09/09/21 110/78   06/04/21 108/78       Pulse Readings from Last 3 Encounters:   02/23/22 69   09/09/21 64   06/04/21 62     EKG on 7/14/2020 sinus rhythm 58/min. Otherwise normal study. Constitutional: This patient is oriented to person, place, and time. Also appears well-developed and well-nourished and in no acute distress. HEENT: Normocephalic and atraumatic. Sclerae anicteric. No xanthelasmas. Conjunctiva white, no subconjunctival hemorrhage   External inspection of ears nose teeth & gums   Eyes:PERRLA EOM's intact. Neck: Neck supple. No JVD present. Carotids without bruits. No mass and no thyromegaly. No lymphadenopathy    Cardiovascular: RRR, normal S1 and S2; no murmur/gallop or rub, PMI nondisplaced  Pulmonary/Chest: Effort normal.  Lungs clear to auscultation. Chest wall nontender  Abdominal: soft, nontender, nondistended. + bowel sounds; no organomegaly or bruits. Aorta normal  Extremities: No edema  Pulses are 2+ radial/carotid/dorsalis pedis bilaterally. Cap refill brisk. Neurological: No cranial nerve deficit. Psychiatric: This patient has a normal mood and affect and the speech is normal as is behavior    Assessment:     CAD  stenting on May 25, 2021.    [facility]  5/25/21 PTCA stent to the LAD mid vessel using drug-eluting stent  PTCA stent to the right coronary artery using drug-eluting stent  PTCA to the proximal aspect of the first large diagonal branch PO BA     stents December 22, 2014      HTN   wnl       HLD  LDL optimal   On Lipitor      Plan:   c/o atypical chest discomfort to touch? feels better with activity and on treadmill  No c/o SOB /fever cough/ no n/v diarrhea / not  worse with deep breaths VSS wt stable   c/o occas GERD feeling / try PPI / recommend GI consult if continues    feels stressed at work & feels he reacts with anxiety  / recommend to see PCP or psychiatric assistance    Consider NSAID or tylenol couple doses   discussed at great length / much emotional support given   On GDMT for CAD   Fu in as planned   Blood work today

## 2022-03-15 DIAGNOSIS — I25.10 CORONARY ARTERY DISEASE DUE TO LIPID RICH PLAQUE: ICD-10-CM

## 2022-03-15 DIAGNOSIS — I25.83 CORONARY ARTERY DISEASE DUE TO LIPID RICH PLAQUE: ICD-10-CM

## 2022-03-15 LAB
A/G RATIO: 2.2 (ref 1.1–2.2)
ALBUMIN SERPL-MCNC: 4.6 G/DL (ref 3.4–5)
ALP BLD-CCNC: 100 U/L (ref 40–129)
ALT SERPL-CCNC: 26 U/L (ref 10–40)
ANION GAP SERPL CALCULATED.3IONS-SCNC: 15 MMOL/L (ref 3–16)
AST SERPL-CCNC: 21 U/L (ref 15–37)
BILIRUB SERPL-MCNC: 0.4 MG/DL (ref 0–1)
BILIRUBIN DIRECT: <0.2 MG/DL (ref 0–0.3)
BILIRUBIN, INDIRECT: NORMAL MG/DL (ref 0–1)
BUN BLDV-MCNC: 15 MG/DL (ref 7–20)
CALCIUM SERPL-MCNC: 10 MG/DL (ref 8.3–10.6)
CHLORIDE BLD-SCNC: 103 MMOL/L (ref 99–110)
CHOLESTEROL, TOTAL: 123 MG/DL (ref 0–199)
CO2: 25 MMOL/L (ref 21–32)
CREAT SERPL-MCNC: 1 MG/DL (ref 0.8–1.3)
FOLATE: >20 NG/ML (ref 4.78–24.2)
GFR AFRICAN AMERICAN: >60
GFR NON-AFRICAN AMERICAN: >60
GLUCOSE BLD-MCNC: 85 MG/DL (ref 70–99)
HCT VFR BLD CALC: 45.4 % (ref 40.5–52.5)
HDLC SERPL-MCNC: 33 MG/DL (ref 40–60)
HEMOGLOBIN: 15.5 G/DL (ref 13.5–17.5)
LDL CHOLESTEROL CALCULATED: 53 MG/DL
MAGNESIUM: 2 MG/DL (ref 1.8–2.4)
MCH RBC QN AUTO: 30.2 PG (ref 26–34)
MCHC RBC AUTO-ENTMCNC: 34.1 G/DL (ref 31–36)
MCV RBC AUTO: 88.5 FL (ref 80–100)
PDW BLD-RTO: 12.7 % (ref 12.4–15.4)
PLATELET # BLD: 211 K/UL (ref 135–450)
PMV BLD AUTO: 9.3 FL (ref 5–10.5)
POTASSIUM SERPL-SCNC: 4.6 MMOL/L (ref 3.5–5.1)
RBC # BLD: 5.13 M/UL (ref 4.2–5.9)
SODIUM BLD-SCNC: 143 MMOL/L (ref 136–145)
TOTAL PROTEIN: 6.7 G/DL (ref 6.4–8.2)
TRIGL SERPL-MCNC: 185 MG/DL (ref 0–150)
TSH REFLEX FT4: 2.68 UIU/ML (ref 0.27–4.2)
VITAMIN B-12: 671 PG/ML (ref 211–911)
VITAMIN D 25-HYDROXY: 38.9 NG/ML
VLDLC SERPL CALC-MCNC: 37 MG/DL
WBC # BLD: 8.8 K/UL (ref 4–11)

## 2022-03-22 ENCOUNTER — OFFICE VISIT (OUTPATIENT)
Dept: CARDIOLOGY CLINIC | Age: 62
End: 2022-03-22
Payer: COMMERCIAL

## 2022-03-22 VITALS
HEART RATE: 58 BPM | SYSTOLIC BLOOD PRESSURE: 98 MMHG | BODY MASS INDEX: 24.5 KG/M2 | HEIGHT: 71 IN | WEIGHT: 175 LBS | DIASTOLIC BLOOD PRESSURE: 62 MMHG

## 2022-03-22 DIAGNOSIS — I25.729 CORONARY ARTERY DISEASE INVOLVING AUTOLOGOUS ARTERY CORONARY BYPASS GRAFT WITH ANGINA PECTORIS (HCC): Primary | ICD-10-CM

## 2022-03-22 DIAGNOSIS — Z95.820 S/P ANGIOPLASTY WITH STENT: ICD-10-CM

## 2022-03-22 DIAGNOSIS — I25.729 CORONARY ARTERY DISEASE INVOLVING AUTOLOGOUS ARTERY CORONARY BYPASS GRAFT WITH ANGINA PECTORIS (HCC): ICD-10-CM

## 2022-03-22 LAB — ABO/RH: NORMAL

## 2022-03-22 PROCEDURE — 99214 OFFICE O/P EST MOD 30 MIN: CPT | Performed by: INTERNAL MEDICINE

## 2022-03-22 NOTE — PROGRESS NOTES
[address]   Dr Anabel Gil. Sasha Orozco MD, [facility] [facility]  Outpatient Follow Up Note         03/22/22  HPI:  Rafal Lucia is 64 y.o. male who presents today for follow-up he is generally doing well without complaints or problems. Feels overall less stressed because his work hours have been reduced to normal 40 hours. Vein may be some vague aches and pains regarding his bigger dose of atorvastatin. He feels very good that his cholesterol numbers are improved with LDL down to 53 and tolerating it very well. At this point I think that we will introduce him to co-Q10 which seems to help improve any type of other issues. His last labs looked very good he still did not get the ABO blood type that he is requesting so we will plan to do that today. Is working with data processing and data input marketing. Coronavirus vaccine Moderna and did have booster      Cath 5/25/21PTCA stent to the LAD mid vessel using drug-eluting stent  PTCA stent to the right coronary artery using drug-eluting stent  PTCA to the proximal aspect of the first large diagonal branch PO BA     CAD with stents December 22, 2014  Hypertension  Hyperlipidemia   Past Medical History:   Diagnosis Date    CAD (coronary artery disease)      PSH  Past Surgical History:   Procedure Laterality Date    CORONARY ANGIOPLASTY   12/23/14    stent mid circ     SH:       Social History     Tobacco Use   Smoking Status Never Smoker   Smokeless Tobacco Never Used     Current Medications:  Prior to Visit Medications    Medication Sig Taking?  Authorizing Provider   clopidogrel (PLAVIX) 75 MG tablet TAKE 1 TABLET BY MOUTH EVERY DAY Yes Marvella Sicard, APRN - CNP   metoprolol tartrate (LOPRESSOR) 25 MG tablet TAKE 1 TABLET BY MOUTH 2 TIMES DAILY Yes Marvella Sicard, APRN - CNP   atorvastatin (LIPITOR) 80 MG tablet Take 1 tablet by mouth daily Yes Jacklyn Godinez MD   Multiple Vitamins-Minerals (CENTRUM ADULTS PO) Take 1 tablet by mouth daily Yes Historical Provider, MD   sildenafil (VIAGRA) 50 MG tablet Take 1 tablet by mouth as needed for Erectile Dysfunction. Yes Charlie Varela MD   aspirin 81 MG chewable tablet Take 1 tablet by mouth daily. Yes Topher Lainez MD     Family History  Family History   Problem Relation Age of Onset    Stroke Mother     Coronary Art Dis Father     No Known Problems Sister     No Known Problems Sister           · ROS:   · Cardiovascular: Reviewed in HPI  · Allergic/Immunologic: No nasal congestion or hives. · All other ROS are reviewed and are unremarkable. PHYSICAL EXAM:      Wt Readings from Last 3 Encounters:   03/22/22 175 lb (79.4 kg)   02/23/22 175 lb (79.4 kg)   09/09/21 175 lb (79.4 kg)       BP Readings from Last 3 Encounters:   03/22/22 98/62   02/23/22 100/60   09/09/21 110/78       Pulse Readings from Last 3 Encounters:   03/22/22 58   02/23/22 69   09/09/21 64     EKG on 7/14/2020 sinus rhythm 58/min. Otherwise normal study. Constitutional: This patient is oriented to person, place, and time. Also appears well-developed and well-nourished and in no acute distress. HEENT: Normocephalic and atraumatic. Sclerae anicteric. No xanthelasmas. Conjunctiva white, no subconjunctival hemorrhage   External inspection of ears nose teeth & gums   Eyes:PERRLA EOM's intact. Neck: Neck supple. No JVD present. Carotids without bruits. No mass and no thyromegaly. No lymphadenopathy    Cardiovascular: RRR, normal S1 and S2; no murmur/gallop or rub, PMI nondisplaced  Pulmonary/Chest: Effort normal.  Lungs clear to auscultation. Chest wall nontender  Abdominal: soft, nontender, nondistended. + bowel sounds; no organomegaly or bruits. Aorta normal  Extremities: No edema  Pulses are 2+ radial/carotid/dorsalis pedis bilaterally. Cap refill brisk. Neurological: No cranial nerve deficit.    Psychiatric: This patient has a normal mood and affect and the speech is normal as is behavior    Assessment:     stable CAD  cath and PTCA  In the cx 12/22/2014 /  HTN   no c/o cp SOB edema / denies PND / denies JOHNIE  Recent hernia repair / doing well     HLD  LDL 52 on Lipitor 80        Plan:   Continue current dose of Lipitor. To have a trial of co-Q10 for the next few weeks. Repeat lipids in about 6 months and return to see me in 6 months. ..   Perry Guzmán MD, [facility]

## 2022-06-08 DIAGNOSIS — I25.729 CORONARY ARTERY DISEASE INVOLVING AUTOLOGOUS ARTERY CORONARY BYPASS GRAFT WITH ANGINA PECTORIS (HCC): ICD-10-CM

## 2022-06-08 DIAGNOSIS — R93.89 ABNORMAL ANGIOGRAM: ICD-10-CM

## 2022-06-08 DIAGNOSIS — E78.5 HYPERLIPIDEMIA, UNSPECIFIED HYPERLIPIDEMIA TYPE: ICD-10-CM

## 2022-06-08 RX ORDER — ATORVASTATIN CALCIUM 80 MG/1
TABLET, FILM COATED ORAL
Qty: 90 TABLET | Refills: 3 | Status: SHIPPED | OUTPATIENT
Start: 2022-06-08

## 2022-08-16 ENCOUNTER — PROCEDURE VISIT (OUTPATIENT)
Dept: AUDIOLOGY | Age: 62
End: 2022-08-16
Payer: COMMERCIAL

## 2022-08-16 ENCOUNTER — OFFICE VISIT (OUTPATIENT)
Dept: ENT CLINIC | Age: 62
End: 2022-08-16
Payer: COMMERCIAL

## 2022-08-16 VITALS
HEIGHT: 71 IN | HEART RATE: 62 BPM | BODY MASS INDEX: 24.25 KG/M2 | DIASTOLIC BLOOD PRESSURE: 64 MMHG | TEMPERATURE: 97.4 F | SYSTOLIC BLOOD PRESSURE: 109 MMHG | WEIGHT: 173.2 LBS

## 2022-08-16 DIAGNOSIS — Z01.10 ENCOUNTER FOR EXAMINATION OF EARS AND HEARING WITHOUT ABNORMAL FINDINGS: ICD-10-CM

## 2022-08-16 DIAGNOSIS — H93.13 TINNITUS, BILATERAL: Primary | ICD-10-CM

## 2022-08-16 DIAGNOSIS — H93.13 TINNITUS OF BOTH EARS: Primary | ICD-10-CM

## 2022-08-16 PROCEDURE — 92567 TYMPANOMETRY: CPT | Performed by: AUDIOLOGIST

## 2022-08-16 PROCEDURE — 99203 OFFICE O/P NEW LOW 30 MIN: CPT | Performed by: OTOLARYNGOLOGY

## 2022-08-16 PROCEDURE — 92557 COMPREHENSIVE HEARING TEST: CPT | Performed by: AUDIOLOGIST

## 2022-08-16 NOTE — PATIENT INSTRUCTIONS
Tinnitus: Overview and Management Strategies          Many people have some ringing sounds in their ears once in a while. You may hear a roar, a hiss, a tinkle, or a buzz. The sound usually lasts only a few minutes. If it goes on all the time, you may have tinnitus. Tinnitus is usually caused by long-term exposure to loud noise. This damages the nerves in the inner ear. It can occur with all types of hearing loss. It may be a symptom of almost any ear problem. Tinnitus may be caused by a buildup of earwax. Or, it may be caused by ear infections or certain medicines (especially antibiotics or large amounts of aspirin). You can also hear noises in your ears because of an injury to the ears, drinking too much alcohol or caffeine, or a medical condition. Other conditions may also contribute to tinnitus, including: head and neck trauma, temporomandibular joint disorder (TMJ), sinus pressure and barometric trauma, traumatic brain injury, metabolic disorders, autoimmune disorders, stress, and high blood pressure. You may need tests to evaluate your hearing and to find causes of long-lasting tinnitus. Your doctor may suggest one or more treatments to help you cope with the tinnitus. You can also do things at home to help reduce symptoms. Follow-up care is a key part of your treatment and safety. Be sure to make and go to all appointments, and call your doctor if you are having problems. It's also a good idea to know your test results and keep a list of the medicines you take. How can you care for yourself at home? Limit or cut out alcohol, caffeine, and sodium. They can make your symptoms worse. Do not smoke or use other tobacco products. Nicotine reduces blood flow to the ear and makes tinnitus worse. If you need help quitting, talk to your doctor about stop-smoking programs and medicines. These can increase your chances of quitting for good.   Talk to your doctor about whether to stop taking aspirin and similar products such as ibuprofen or naproxen. Get exercise often. It can help improve blood flow to the ear. Ways to manage/cope with tinnitus  Some tinnitus may last a long time. To manage your tinnitus, try to: Avoid noises that you think caused your tinnitus. If you can't avoid loud noises, wear earplugs or earmuffs. Ignore the sound by paying attention to other things. Keeping your brain busy with other tasks or background noise can help your brain not focus on the tinnitus. Try to not give the tinnitus an emotional reaction. Do your best to ignore the sound and not let it bother you. Relax using biofeedback, meditation, or yoga. Feeling stressed and being tired can make tinnitus worse. Play music or white noise to help you sleep. Background noise may cover up the noise that you hear in your ears. You can buy a tabletop machine or a device that sits under your pillow to play soothing sounds, like ocean waves. Smart phones have free apps, such as Whist, Relax Melodies, ReSound Relief, and [employer]. These apps have different types of sounds/noise, some of which you can blend together to find sounds that are most soothing to you. Hearing aid technology, especially when there is some hearing loss, may help reduce tinnitus symptoms by giving your brain better access to the sounds it is missing. There are some hearing aids with built-in noise generator programs, which may help when amplification alone is not enough. Additional resources may be found through the American Tinnitus Association at www.javed.org    When should you call for help? Call 911 anytime you think you may need emergency care. For example, call if:    You have symptoms of a stroke. These may include:  Sudden numbness, tingling, weakness, or loss of movement in your face, arm, or leg, especially on only one side of your body. Sudden vision changes. Sudden trouble speaking.   Sudden confusion or trouble understanding simple statements. Sudden problems with walking or balance. A sudden, severe headache that is different from past headaches. Call your doctor now or seek immediate medical care if:    You develop other symptoms. These may include hearing loss (or worse hearing loss), balance problems, dizziness, nausea, or vomiting. Watch closely for changes in your health, and be sure to contact your doctor if:    Your tinnitus moves from both ears to one ear. Your hearing loss gets worse within 1 day after an ear injury. Your tinnitus or hearing loss does not get better within 1 week after an ear injury. Your tinnitus bothers you enough that you want to take medicines to help you cope with it. If you notice changes in your tinnitus and/or your hearing, it is recommended that you have your hearing tested by your audiologist and to follow-up with your physician that manages your hearing loss (such as your ENT or Primary Care doctor). Good Communication Strategies    Communication can be challenging for anyone, but can be especially difficult for those with some degree of hearing loss. While we may not be able to control every factor that may lead to difficulty with communication, there are Good Communication Strategies that we can all use in our day-to-day lives. Communication takes both parties working together for it to be successful. Tips as a Listener:   Control your environment. It is important to limit the amount of background noise in the room when possible. You should also consider having a good light source in the room to best see the other person. Ask for clarification. Instead of saying \"What?\", you can use parts of what you heard to make a new question. For example, if you heard the word \"Thursday\" but not the rest of the week, you may ask \"What was that about Thursday? \" or \"What did you want to do Thursday? \".   This shows the person talking that you are listening and will help them better explain what they are saying. Be an advocate for yourself. If you are hearing but not understanding, tell the other person \"I can hear you, but I need you to slow down when you speak. \"  Or if someone is facing the other direction, say \"I cannot hear you when you are not looking at me when we talk. \"       Tips as a Talker:   - Sit or stand 3 to 6 feet away to maximize audibility         -- It is unrealistic to believe someone else will fully hear your message if you are speaking from across the room or in a different room in the house   - Stay at eye level to help with visual cues   - Make sure you have the persons attention before speaking   - Use facial expressions and gestures to accentuate your message   - Raise your voice slightly (do not scream)   - Speak slowly and distinctly   - Use short, simple sentences   - Rephrase your words if the person is having a hard time understanding you    - To avoid distortion, dont speak directly into a persons ear      Some additional items that may be helpful:   - Remain patient - this is important for both parties   - Write down items that still cannot be heard/understood. You may write with pen/paper or consider typing/texting on a cell phone or smart device. - If background noise is unavoidable, try to keep yourself in a good position in the room. By sitting at a erazo on the side of the restaurant (preferably a corner), it will be easier to communicate than if you were sitting at a table in the middle with background noise surrounding you. Keep yourself positioned away from music speakers or heavy foot traffic.   - If you have difficulty with the television, consider these options:      -- Use closed-captioning, which is a setting you can turn on that displays the spoken words in a written form on the screen. There may be a slight delay, but this can help fill in missing information.   This can be especially helpful when watching programs with accented speech. -- Consider use of a sound bar or speakers that come from the front of the TV. With modern flat screen TVs, many of them have speakers that come out of the back of the device, which makes sound bounce off the wall behind it, then go into the room. Sound bars can allow the sound to go straight in your direction and can improve sound quality. -- Consider ear level devices to help improve the volume and/or sound quality of the program.  There are devices that work like headphones that you can adjust the volume for your ears while others can have the volume at a more comfortable level, such as \"TV Ears\". Most hearing aids have devices that allow them to connect directly to the TV and improve sound quality.

## 2022-08-16 NOTE — Clinical Note
Dr. Laray Mortimer,  Please see note from this patient's audiogram.  Please let me know if there is anything further you need.    Alfredo Mcdermott [address] [place] Audiologist

## 2022-08-16 NOTE — PROGRESS NOTES
CHIEF COMPLAINT: Tinnitus    HISTORY OF PRESENT ILLNESS:  64 y.o. male referred by Dr. Karina Valdovinos who presents with tinnitus both ears. High pitch, non pulsatile. Insidious onset over 2 months. More noticeable in quiet environment. Hearing is is decreased in both ears. No noise exposure. Family history positive for hearing loss. PAST MEDICAL HISTORY:   Social History     Tobacco Use   Smoking Status Never   Smokeless Tobacco Never                                                    Social History     Substance and Sexual Activity   Alcohol Use Never                                                    Current Outpatient Medications:     atorvastatin (LIPITOR) 80 MG tablet, TAKE 1 TABLET BY MOUTH EVERY DAY, Disp: 90 tablet, Rfl: 3    clopidogrel (PLAVIX) 75 MG tablet, TAKE 1 TABLET BY MOUTH EVERY DAY, Disp: 90 tablet, Rfl: 2    metoprolol tartrate (LOPRESSOR) 25 MG tablet, TAKE 1 TABLET BY MOUTH 2 TIMES DAILY, Disp: 180 tablet, Rfl: 3    Multiple Vitamins-Minerals (CENTRUM ADULTS PO), Take 1 tablet by mouth daily, Disp: , Rfl:     sildenafil (VIAGRA) 50 MG tablet, Take 1 tablet by mouth as needed for Erectile Dysfunction. , Disp: 30 tablet, Rfl: 3    aspirin 81 MG chewable tablet, Take 1 tablet by mouth daily. , Disp: 30 tablet, Rfl: 3                                                 Past Medical History:   Diagnosis Date    Arthritis     CAD (coronary artery disease)     Hearing loss     Tinnitus                                                     Past Surgical History:   Procedure Laterality Date    CORONARY ANGIOPLASTY  12/23/2014    stent mid circ    TONSILLECTOMY       FAMILY HISTORY: Family history reviewed. Except as noted in history of present illness, there is no pertinent family history      REVIEW OF SYSTEMS:  All pertinent positive and negative review of systems included in HPI. Otherwise, all systems are reviewed and negative.     PHYSICAL EXAMINATION:   GENERAL: wdwn- no acute distress  RESPIRATORY:  No stridor or respiratory distress  COMMUNICATION :  Normal voice  MENTAL STATUS:  Mood and affect normal, oriented X 3  HEAD AND FACE:  No abnormalities of the skin of face or head  EXTERNAL EARS AND NOSE:  Normal pinnae bilateral  FACIAL MUSCLES:  All branches of facial nerve intact  EXTRAOCULAR MUSCLES: Intact with full range of motion  FACE PALPATION:  No tenderness over sinuses. Zygomatic arches and orbital rims intact  OTOSCOPY:  Normal external auditory canals, tympanic membranes, and middle ear spaces  TUNING FORKS: Rinne ++ Mary midline at 512 Hz  INTRANASAL:  Septum midline, turbinates normal, meati clear. LIPS, TEETH, GINGIVA:  Normal mucosa  PHARYNX:  Normal  NECK:  No masses. LYMPHATIC:  No cervical adenopathy  SALIVARY GLANDS:  No swelling or masses in the parotid or submandibular salivary glands  THYROID:  No goiter or thyroid masses. AUDIOGRAM & TYMPANOGRAM ORDERED AND REVIEWED: Symmetrical excellent hearing. Excellent auditory discrimination. Normal middle ear dynamics. IMPRESSION: Bilateral tinnitus. PLAN: Discussed the etiology of tinnitus with the patient recommend trial of melatonin 3 mg at night for tinnitus control. FOLLOW-UP: As needed.

## 2022-08-16 NOTE — PROGRESS NOTES
Shavonne Acevedo   1960, 64 y.o. male   [id number]       Referring Provider: Shelly Larios MD  Referral Type: In an order in [address] [place] [address]    Reason for Visit: Evaluation of suspected change in hearing, tinnitus, or balance. ADULT AUDIOLOGIC EVALUATION      Shavonne Acevedo is a 64 y.o. male seen today, 8/16/2022, for an initial audiologic evaluation. AUDIOLOGIC AND OTHER PERTINENT MEDICAL HISTORY:        Shavonne Acevedo noted tinnitus bilaterally, present for about 2 months, bothersome in quiet environments; concern for decreased hearing, difficulty in background noise; some fullness in ears at times; family history of hearing loss - mother with history of ear surgeries. Shavonne Acevedo denied otalgia, otorrhea, dizziness, imbalance, history of occupational/recreational noise exposure, history of head trauma, and history of ear surgery. IMPRESSIONS:       Today's results are consistent with hearing sensitivity within normal limits with normal middle ear function and excellent word recognition for soft conversational speech in both ears and normal middle ear function in both ears. Discussed tinnitus management strategies; follow medical recommendations from Dr. Rand Asher. ASSESSMENT AND FINDINGS:       Otoscopy revealed: Clear ear canals bilaterally      RIGHT EAR:  Hearing Sensitivity: Within normal limits. Speech Recognition Threshold: 15 dBHL  Word Recognition: Excellent (96%), based on NU-6 25-word list at 45 dBHL using recorded speech stimuli. Tympanometry: Normal peak pressure and compliance, Type A tympanogram, consistent with normal middle ear function. LEFT EAR:  Hearing Sensitivity: Within normal limits. Speech Recognition Threshold: 15 dBHL  Word Recognition: Excellent (100%), based on NU-6 25-word list at 45 dBHL using recorded speech stimuli. Tympanometry: Normal peak pressure and compliance, Type A tympanogram, consistent with normal middle ear function.       Reliability: Good  Transducer: Inserts    See scanned audiogram dated 8/16/2022 for results. PATIENT EDUCATION:       The following items were discussed with the patient:   - Good Communication Strategies  - Tinnitus Management Strategies      Educational information was shared in the After Visit Summary. RECOMMENDATIONS:                                                                                                                                                                                                                                                                      The following items are recommended based on patient report and results from today's appointment:  - Continue medical follow-up with Marita Champion MD.  - Retest hearing as medically indicated and/or sooner if a change in hearing is noted. - Utilize \"Good Communication Strategies\" as discussed to assist in speech understanding with communication partners. - Maintain a sound enriched environment to assist in the management of tinnitus symptoms. [facility] [place], [place]  Audiologist      Chart CC'd to:  Marita Champion MD      Degree of   Hearing Sensitivity dB Range   Within Normal Limits (WNL) 0 - 20   Mild 20 - 40   Moderate 40 - 55   Moderately-Severe 55 - 70   Severe 70 - 90   Profound 90 +

## 2022-09-08 DIAGNOSIS — I25.10 CORONARY ARTERY DISEASE INVOLVING NATIVE CORONARY ARTERY OF NATIVE HEART WITHOUT ANGINA PECTORIS: ICD-10-CM

## 2022-09-09 RX ORDER — CLOPIDOGREL BISULFATE 75 MG/1
TABLET ORAL
Qty: 90 TABLET | Refills: 1 | Status: SHIPPED | OUTPATIENT
Start: 2022-09-09

## 2022-09-20 DIAGNOSIS — I25.10 CORONARY ARTERY DISEASE INVOLVING NATIVE CORONARY ARTERY OF NATIVE HEART WITHOUT ANGINA PECTORIS: ICD-10-CM

## 2022-09-20 DIAGNOSIS — I25.729 CORONARY ARTERY DISEASE INVOLVING AUTOLOGOUS ARTERY CORONARY BYPASS GRAFT WITH ANGINA PECTORIS (HCC): ICD-10-CM

## 2022-09-20 DIAGNOSIS — Z95.820 S/P ANGIOPLASTY WITH STENT: ICD-10-CM

## 2022-09-20 DIAGNOSIS — R00.2 HEART PALPITATIONS: ICD-10-CM

## 2022-09-20 DIAGNOSIS — Z01.818 PRE-OP EXAM: ICD-10-CM

## 2022-09-20 DIAGNOSIS — E78.5 HYPERLIPIDEMIA, UNSPECIFIED HYPERLIPIDEMIA TYPE: Primary | ICD-10-CM

## 2022-09-21 DIAGNOSIS — I25.729 CORONARY ARTERY DISEASE INVOLVING AUTOLOGOUS ARTERY CORONARY BYPASS GRAFT WITH ANGINA PECTORIS (HCC): ICD-10-CM

## 2022-09-21 DIAGNOSIS — Z01.818 PRE-OP EXAM: ICD-10-CM

## 2022-09-21 DIAGNOSIS — E78.5 HYPERLIPIDEMIA, UNSPECIFIED HYPERLIPIDEMIA TYPE: ICD-10-CM

## 2022-09-21 DIAGNOSIS — I25.10 CORONARY ARTERY DISEASE INVOLVING NATIVE CORONARY ARTERY OF NATIVE HEART WITHOUT ANGINA PECTORIS: ICD-10-CM

## 2022-09-21 DIAGNOSIS — Z95.820 S/P ANGIOPLASTY WITH STENT: ICD-10-CM

## 2022-09-21 DIAGNOSIS — R00.2 HEART PALPITATIONS: ICD-10-CM

## 2022-09-21 LAB
A/G RATIO: 2.1 (ref 1.1–2.2)
ALBUMIN SERPL-MCNC: 4.5 G/DL (ref 3.4–5)
ALP BLD-CCNC: 93 U/L (ref 40–129)
ALT SERPL-CCNC: 20 U/L (ref 10–40)
ANION GAP SERPL CALCULATED.3IONS-SCNC: 12 MMOL/L (ref 3–16)
AST SERPL-CCNC: 17 U/L (ref 15–37)
BILIRUB SERPL-MCNC: 0.3 MG/DL (ref 0–1)
BILIRUBIN DIRECT: <0.2 MG/DL (ref 0–0.3)
BILIRUBIN, INDIRECT: NORMAL MG/DL (ref 0–1)
BUN BLDV-MCNC: 12 MG/DL (ref 7–20)
CALCIUM SERPL-MCNC: 9.5 MG/DL (ref 8.3–10.6)
CHLORIDE BLD-SCNC: 100 MMOL/L (ref 99–110)
CHOLESTEROL, TOTAL: 119 MG/DL (ref 0–199)
CO2: 27 MMOL/L (ref 21–32)
CREAT SERPL-MCNC: 0.9 MG/DL (ref 0.8–1.3)
GFR AFRICAN AMERICAN: >60
GFR NON-AFRICAN AMERICAN: >60
GLUCOSE BLD-MCNC: 86 MG/DL (ref 70–99)
HCT VFR BLD CALC: 46.4 % (ref 40.5–52.5)
HDLC SERPL-MCNC: 35 MG/DL (ref 40–60)
HEMOGLOBIN: 15.6 G/DL (ref 13.5–17.5)
LDL CHOLESTEROL CALCULATED: 54 MG/DL
MAGNESIUM: 2.1 MG/DL (ref 1.8–2.4)
MCH RBC QN AUTO: 29.7 PG (ref 26–34)
MCHC RBC AUTO-ENTMCNC: 33.7 G/DL (ref 31–36)
MCV RBC AUTO: 88.1 FL (ref 80–100)
PDW BLD-RTO: 12.8 % (ref 12.4–15.4)
PLATELET # BLD: 208 K/UL (ref 135–450)
PMV BLD AUTO: 9.6 FL (ref 5–10.5)
POTASSIUM SERPL-SCNC: 4.2 MMOL/L (ref 3.5–5.1)
RBC # BLD: 5.26 M/UL (ref 4.2–5.9)
SODIUM BLD-SCNC: 139 MMOL/L (ref 136–145)
TOTAL PROTEIN: 6.6 G/DL (ref 6.4–8.2)
TRIGL SERPL-MCNC: 148 MG/DL (ref 0–150)
TSH SERPL DL<=0.05 MIU/L-ACNC: 2.48 UIU/ML (ref 0.27–4.2)
VITAMIN D 25-HYDROXY: 46.6 NG/ML
VLDLC SERPL CALC-MCNC: 30 MG/DL
WBC # BLD: 8.3 K/UL (ref 4–11)

## 2022-09-27 ENCOUNTER — OFFICE VISIT (OUTPATIENT)
Dept: CARDIOLOGY CLINIC | Age: 62
End: 2022-09-27
Payer: COMMERCIAL

## 2022-09-27 VITALS
WEIGHT: 174 LBS | HEART RATE: 60 BPM | DIASTOLIC BLOOD PRESSURE: 64 MMHG | SYSTOLIC BLOOD PRESSURE: 98 MMHG | BODY MASS INDEX: 24.27 KG/M2

## 2022-09-27 DIAGNOSIS — Z95.820 S/P ANGIOPLASTY WITH STENT: ICD-10-CM

## 2022-09-27 DIAGNOSIS — E78.5 HYPERLIPIDEMIA LDL GOAL <70: Primary | ICD-10-CM

## 2022-09-27 DIAGNOSIS — I25.729 CORONARY ARTERY DISEASE INVOLVING AUTOLOGOUS ARTERY CORONARY BYPASS GRAFT WITH ANGINA PECTORIS (HCC): ICD-10-CM

## 2022-09-27 PROCEDURE — 99214 OFFICE O/P EST MOD 30 MIN: CPT | Performed by: INTERNAL MEDICINE

## 2022-09-27 NOTE — PROGRESS NOTES
[address]   Dr Raymond Perez. Gareth Christopher MD, [facility]  Outpatient Follow Up Note         09/27/22  HPI:  Linnette Hanley is 64 y.o. male who presents today for follow-up he is generally doing well without complaints or problems. Doing well today without complaints or problems. Relates that he has had some tinnitus apparently has seen audiology and ENT and was told that he is hearing is above average. No chest pain shortness of breath or dyspnea. He is on Lipitor 80 mg and his last LDL was 54 down from 107. Examination today is unremarkable. Lungs are clear extremities show no edema. Is working with data processing and data input marketing. Coronavirus vaccine Moderna and did have booster      Cath 5/25/21PTCA stent to the LAD mid vessel using drug-eluting stent  PTCA stent to the right coronary artery using drug-eluting stent  PTCA to the proximal aspect of the first large diagonal branch PO BA     CAD with stents December 22, 2014  Hypertension  Hyperlipidemia     Past Medical History:   Diagnosis Date    Arthritis     CAD (coronary artery disease)     Hearing loss     Tinnitus      PSH  Past Surgical History:   Procedure Laterality Date    CORONARY ANGIOPLASTY  12/23/2014    stent mid circ    TONSILLECTOMY       SH:       Social History     Tobacco Use   Smoking Status Never   Smokeless Tobacco Never     Current Medications:  Prior to Visit Medications    Medication Sig Taking?  Authorizing Provider   clopidogrel (PLAVIX) 75 MG tablet TAKE 1 TABLET BY MOUTH EVERY DAY Yes Teresa Sails, APRN - CNP   metoprolol tartrate (LOPRESSOR) 25 MG tablet TAKE 1 TABLET BY MOUTH 2 TIMES DAILY Yes Teresa Sails, APRN - CNP   atorvastatin (LIPITOR) 80 MG tablet TAKE 1 TABLET BY MOUTH EVERY DAY Yes Tracie Gonzalez MD   Multiple Vitamins-Minerals (CENTRUM ADULTS PO) Take 1 tablet by mouth daily Yes Historical Provider, MD   sildenafil (VIAGRA) 50 MG tablet Take 1 tablet by mouth as needed for Erectile Dysfunction. Yes Nadya Telles MD   aspirin 81 MG chewable tablet Take 1 tablet by mouth daily. Yes Martin Fonseca MD     Family History  Family History   Problem Relation Age of Onset    Stroke Mother     Coronary Art Dis Father     No Known Problems Sister     No Known Problems Sister           ROS:   Cardiovascular: Reviewed in HPI  Allergic/Immunologic: No nasal congestion or hives. All other ROS are reviewed and are unremarkable. PHYSICAL EXAM:      Wt Readings from Last 3 Encounters:   09/27/22 174 lb (78.9 kg)   08/16/22 173 lb 3.2 oz (78.6 kg)   03/22/22 175 lb (79.4 kg)       BP Readings from Last 3 Encounters:   09/27/22 98/64   08/16/22 109/64   03/22/22 98/62       Pulse Readings from Last 3 Encounters:   09/27/22 60   08/16/22 62   03/22/22 58     EKG on 7/14/2020 sinus rhythm 58/min. Otherwise normal study. Constitutional: This patient is oriented to person, place, and time. Also appears well-developed and well-nourished and in no acute distress. HEENT: Normocephalic and atraumatic. Sclerae anicteric. No xanthelasmas. Conjunctiva white, no subconjunctival hemorrhage   External inspection of ears nose teeth & gums   Eyes:PERRLA EOM's intact. Neck: Neck supple. No JVD present. Carotids without bruits. No mass and no thyromegaly. No lymphadenopathy    Cardiovascular: RRR, normal S1 and S2; no murmur/gallop or rub, PMI nondisplaced  Pulmonary/Chest: Effort normal.  Lungs clear to auscultation. Chest wall nontender  Abdominal: soft, nontender, nondistended. + bowel sounds; no organomegaly or bruits. Aorta normal  Extremities: No edema  Pulses are 2+ radial/carotid/dorsalis pedis bilaterally. Cap refill brisk. Neurological: No cranial nerve deficit.    Psychiatric: This patient has a normal mood and affect and the speech is normal as is behavior    Assessment:     stable CAD  cath and PTCA  In the cx 12/22/2014 /  HTN   no c/o cp SOB edema / denies PND / denies JOHNIE  Recent hernia repair / doing well     HLD  LDL 52 on Lipitor 80        Plan:   Continue current therapy. We will have him return to see me in 1 year. Call me if there are problems. ..   Rikki Stokes MD, [facility] [place]

## 2023-06-07 DIAGNOSIS — I25.729 CORONARY ARTERY DISEASE INVOLVING AUTOLOGOUS ARTERY CORONARY BYPASS GRAFT WITH ANGINA PECTORIS (HCC): ICD-10-CM

## 2023-06-07 DIAGNOSIS — E78.5 HYPERLIPIDEMIA, UNSPECIFIED HYPERLIPIDEMIA TYPE: ICD-10-CM

## 2023-06-07 DIAGNOSIS — R93.89 ABNORMAL ANGIOGRAM: ICD-10-CM

## 2023-06-07 RX ORDER — ATORVASTATIN CALCIUM 80 MG/1
TABLET, FILM COATED ORAL
Qty: 90 TABLET | Refills: 3 | Status: SHIPPED | OUTPATIENT
Start: 2023-06-07

## 2023-09-27 ENCOUNTER — OFFICE VISIT (OUTPATIENT)
Dept: CARDIOLOGY CLINIC | Age: 63
End: 2023-09-27
Payer: COMMERCIAL

## 2023-09-27 VITALS
SYSTOLIC BLOOD PRESSURE: 106 MMHG | HEIGHT: 71 IN | HEART RATE: 64 BPM | BODY MASS INDEX: 24.92 KG/M2 | DIASTOLIC BLOOD PRESSURE: 62 MMHG | WEIGHT: 178 LBS

## 2023-09-27 DIAGNOSIS — I25.10 CORONARY ARTERY DISEASE INVOLVING NATIVE CORONARY ARTERY OF NATIVE HEART WITHOUT ANGINA PECTORIS: ICD-10-CM

## 2023-09-27 DIAGNOSIS — E78.5 HYPERLIPIDEMIA LDL GOAL <70: ICD-10-CM

## 2023-09-27 DIAGNOSIS — E78.5 HYPERLIPIDEMIA, UNSPECIFIED HYPERLIPIDEMIA TYPE: ICD-10-CM

## 2023-09-27 DIAGNOSIS — R07.9 CHEST PAIN, UNSPECIFIED TYPE: Primary | ICD-10-CM

## 2023-09-27 LAB
ALBUMIN SERPL-MCNC: 4.2 G/DL (ref 3.4–5)
ALBUMIN/GLOB SERPL: 1.9 {RATIO} (ref 1.1–2.2)
ALP SERPL-CCNC: 90 U/L (ref 40–129)
ALT SERPL-CCNC: 22 U/L (ref 10–40)
ANION GAP SERPL CALCULATED.3IONS-SCNC: 10 MMOL/L (ref 3–16)
AST SERPL-CCNC: 19 U/L (ref 15–37)
BILIRUB SERPL-MCNC: 0.3 MG/DL (ref 0–1)
BUN SERPL-MCNC: 20 MG/DL (ref 7–20)
CALCIUM SERPL-MCNC: 8.8 MG/DL (ref 8.3–10.6)
CHLORIDE SERPL-SCNC: 105 MMOL/L (ref 99–110)
CHOLEST SERPL-MCNC: 118 MG/DL (ref 0–199)
CO2 SERPL-SCNC: 28 MMOL/L (ref 21–32)
CREAT SERPL-MCNC: 1 MG/DL (ref 0.8–1.3)
GFR SERPLBLD CREATININE-BSD FMLA CKD-EPI: >60 ML/MIN/{1.73_M2}
GLUCOSE SERPL-MCNC: 83 MG/DL (ref 70–99)
HDLC SERPL-MCNC: 34 MG/DL (ref 40–60)
LDLC SERPL CALC-MCNC: 53 MG/DL
POTASSIUM SERPL-SCNC: 4.5 MMOL/L (ref 3.5–5.1)
PROT SERPL-MCNC: 6.4 G/DL (ref 6.4–8.2)
SODIUM SERPL-SCNC: 143 MMOL/L (ref 136–145)
TRIGL SERPL-MCNC: 155 MG/DL (ref 0–150)
VLDLC SERPL CALC-MCNC: 31 MG/DL

## 2023-09-27 PROCEDURE — 99214 OFFICE O/P EST MOD 30 MIN: CPT | Performed by: INTERNAL MEDICINE

## 2023-09-27 PROCEDURE — 93000 ELECTROCARDIOGRAM COMPLETE: CPT | Performed by: INTERNAL MEDICINE

## 2023-09-27 NOTE — PROGRESS NOTES
[facility]   Dr Shirley Nash. Hedy De La Cruz MD, [address] [facility]   Outpatient Follow Up Note         09/27/23  HPI:  Cooper Holstein is 58 y.o. male who presents today for follow-up he is generally doing well without complaints or problems. Today is 9/27/2023. The patient is doing well. Contemplating long-term by June of next year. Contemplating selling his house and moving to a different area of town. He is moving about without any issue issues or complications. His last LDL was 54. Lipid and CMP today. Is working with data processing and data input marketing. Coronavirus vaccine Moderna and did have booster      Cath 5/25/21PTCA stent to the LAD mid vessel using drug-eluting stent  PTCA stent to the right coronary artery using drug-eluting stent  PTCA to the proximal aspect of the first large diagonal branch PO BA     CAD with stents December 22, 2014  Hypertension  Hyperlipidemia     Past Medical History:   Diagnosis Date    Arthritis     CAD (coronary artery disease)     Hearing loss     Tinnitus      PSH  Past Surgical History:   Procedure Laterality Date    CORONARY ANGIOPLASTY  12/23/2014    stent mid circ    TONSILLECTOMY       SH:       Social History     Tobacco Use   Smoking Status Never   Smokeless Tobacco Never     Current Medications:  Prior to Visit Medications    Medication Sig Taking? Authorizing Provider   atorvastatin (LIPITOR) 80 MG tablet TAKE 1 TABLET BY MOUTH EVERY DAY Yes HEATHER Lobo CNP   metoprolol tartrate (LOPRESSOR) 25 MG tablet Take 1 tablet by mouth 2 times daily Yes HEATHER Lobo CNP   clopidogrel (PLAVIX) 75 MG tablet TAKE 1 TABLET BY MOUTH EVERY DAY Yes HEATHER Lobo CNP   Multiple Vitamins-Minerals (CENTRUM ADULTS PO) Take 1 tablet by mouth daily Yes Historical Provider, MD   aspirin 81 MG chewable tablet Take 1 tablet by mouth daily.  Yes Yany Johnson MD     Family History  Family History   Problem

## 2023-11-06 ENCOUNTER — PATIENT MESSAGE (OUTPATIENT)
Dept: CARDIOLOGY CLINIC | Age: 63
End: 2023-11-06

## 2023-11-07 NOTE — TELEPHONE ENCOUNTER
From: Narda Pickett  To: Dr. Ambriz Place: 11/6/2023 9:27 PM EST  Subject: slight feeling in inner thigh    Had my physical on Friday and told him about a feeling in my right side inner thigh. Had it x-rayed and now doctor wants me to get an MRI which I have scheduled in 3 weeks. Here it what he wrote \"X-ray of the sacrum shows bilateral thickening of heart muscle in your leg, this could be the pain you are feeling. I am going to send in for an MRI for further evaluation of this. \"  Just thought I would let you know. It is not painful nor does it inhibit me from doing anything. Feels more like a light pressure.

## 2023-11-28 NOTE — TELEPHONE ENCOUNTER
Called patient to let him know that Adron Number is out until December 4,2023 and at that time I will show him the MRI report and contact the patient.

## 2024-06-04 DIAGNOSIS — I25.729 CORONARY ARTERY DISEASE INVOLVING AUTOLOGOUS ARTERY CORONARY BYPASS GRAFT WITH ANGINA PECTORIS (HCC): ICD-10-CM

## 2024-06-04 DIAGNOSIS — R93.89 ABNORMAL ANGIOGRAM: ICD-10-CM

## 2024-06-04 DIAGNOSIS — E78.5 HYPERLIPIDEMIA, UNSPECIFIED HYPERLIPIDEMIA TYPE: ICD-10-CM

## 2024-06-04 RX ORDER — ATORVASTATIN CALCIUM 80 MG/1
TABLET, FILM COATED ORAL
Qty: 90 TABLET | Refills: 0 | Status: SHIPPED | OUTPATIENT
Start: 2024-06-04

## 2024-09-05 ENCOUNTER — OFFICE VISIT (OUTPATIENT)
Dept: CARDIOLOGY CLINIC | Age: 64
End: 2024-09-05
Payer: COMMERCIAL

## 2024-09-05 VITALS
SYSTOLIC BLOOD PRESSURE: 110 MMHG | DIASTOLIC BLOOD PRESSURE: 72 MMHG | HEIGHT: 71 IN | HEART RATE: 52 BPM | BODY MASS INDEX: 24.22 KG/M2 | WEIGHT: 173 LBS

## 2024-09-05 DIAGNOSIS — E78.5 HYPERLIPIDEMIA, UNSPECIFIED HYPERLIPIDEMIA TYPE: ICD-10-CM

## 2024-09-05 DIAGNOSIS — R07.9 CHEST PAIN, UNSPECIFIED TYPE: Primary | ICD-10-CM

## 2024-09-05 DIAGNOSIS — R93.89 ABNORMAL ANGIOGRAM: ICD-10-CM

## 2024-09-05 DIAGNOSIS — I25.729 CORONARY ARTERY DISEASE INVOLVING AUTOLOGOUS ARTERY CORONARY BYPASS GRAFT WITH ANGINA PECTORIS (HCC): ICD-10-CM

## 2024-09-05 DIAGNOSIS — I25.10 CORONARY ARTERY DISEASE INVOLVING NATIVE CORONARY ARTERY OF NATIVE HEART WITHOUT ANGINA PECTORIS: ICD-10-CM

## 2024-09-05 PROCEDURE — 99215 OFFICE O/P EST HI 40 MIN: CPT | Performed by: NURSE PRACTITIONER

## 2024-09-05 PROCEDURE — 93000 ELECTROCARDIOGRAM COMPLETE: CPT | Performed by: NURSE PRACTITIONER

## 2024-09-05 RX ORDER — CLOPIDOGREL BISULFATE 75 MG/1
75 TABLET ORAL DAILY
Qty: 90 TABLET | Refills: 5 | Status: SHIPPED | OUTPATIENT
Start: 2024-09-05

## 2024-09-05 RX ORDER — ATORVASTATIN CALCIUM 80 MG/1
80 TABLET, FILM COATED ORAL DAILY
Qty: 90 TABLET | Refills: 5 | Status: SHIPPED | OUTPATIENT
Start: 2024-09-05

## 2024-09-05 RX ORDER — METOPROLOL SUCCINATE 25 MG/1
25 TABLET, EXTENDED RELEASE ORAL DAILY
Qty: 90 TABLET | Refills: 5 | Status: SHIPPED | OUTPATIENT
Start: 2024-09-05

## 2024-09-05 NOTE — PROGRESS NOTES
[facility]     Outpatient Follow Up Note  Dr Fede Armstrong MD,  FACC   Leana Hook RN, APRN,CNP CVNP      CHIEF COMPLAINT / HPI:  Daniel Marmolejo is 63 y.o. male who presents today with the need of CC for colonoscopy for positive colo guard        PMH: [facility] 5/25/21 PTCA stent to the LAD mid vessel using drug-eluting stent  PTCA stent to the right coronary artery using drug-eluting stent  PTCA to the proximal aspect of the first large diagonal branch PO BA     CAD with stents December 22, 2014  HTN HLD   LDL 53 in 2023   No NSIADS due to on asa and plavix  Tylenol prn as needed     Interval history:  VSS wt stable no c/o cp SOB edema no c/o PND JOHNIE   Active eats healthy   EKG today WNL   Per cardiology he is a mild risk with CAD, EKG is normal he has no c/p SOB he is active  Per cardiology he may have needed colonoscopy      May hold plavix as needed and restart ASAP      I spent a total of 50 minutes and greater than 50% of the time was spent counseling with patient  coordinating care regarding her diagnosis, reviewing labs, cardiac work up, treatments and plan of care.    Past Medical History:   Diagnosis Date    Arthritis     CAD (coronary artery disease)     Hearing loss     Tinnitus      Social History:    Social History     Tobacco Use   Smoking Status Never   Smokeless Tobacco Never     Current Medications:  Current Outpatient Medications   Medication Sig Dispense Refill    atorvastatin (LIPITOR) 80 MG tablet TAKE 1 TABLET BY MOUTH EVERY DAY 90 tablet 0    metoprolol tartrate (LOPRESSOR) 25 MG tablet Take 1 tablet by mouth 2 times daily 180 tablet 3    clopidogrel (PLAVIX) 75 MG tablet TAKE 1 TABLET BY MOUTH EVERY DAY 90 tablet 1    Multiple Vitamins-Minerals (CENTRUM ADULTS PO) Take 1 tablet by mouth daily      aspirin 81 MG chewable tablet Take 1 tablet by mouth daily. 30 tablet 3     No current facility-administered medications for this visit.     REVIEW OF SYSTEMS:    CONSTITUTIONAL: No

## 2025-05-12 NOTE — TELEPHONE ENCOUNTER
Requested Prescriptions     Pending Prescriptions Disp Refills    metoprolol tartrate (LOPRESSOR) 25 MG tablet [Pharmacy Med Name: METOPROLOL TARTRATE 25 MG TAB] 180 tablet 3     Sig: TAKE 1 TABLET BY MOUTH TWICE A DAY            Checked Correct Pharmacy: Yes  Any changes since last refill? No     Number: 180  Refills: 2    Last OV: 9/5/2024 Provider: DOROTA  Next OV: 9/4/2025 Provider: TORSTEN    Last Labs: 05/26/2021    BMP:  Lab Results   Component Value Date/Time     09/27/2023 10:27 AM    K 4.5 09/27/2023 10:27 AM    K 4.4 05/26/2021 09:01 AM     09/27/2023 10:27 AM    CO2 28 09/27/2023 10:27 AM    BUN 20 09/27/2023 10:27 AM    CREATININE 1.0 09/27/2023 10:27 AM    GLUCOSE 83 09/27/2023 10:27 AM    CALCIUM 8.8 09/27/2023 10:27 AM    LABGLOM >60 09/27/2023 10:27 AM

## 2025-05-13 RX ORDER — METOPROLOL TARTRATE 25 MG/1
25 TABLET, FILM COATED ORAL 2 TIMES DAILY
Qty: 180 TABLET | Refills: 3 | Status: SHIPPED | OUTPATIENT
Start: 2025-05-13

## 2025-08-12 ENCOUNTER — PATIENT MESSAGE (OUTPATIENT)
Dept: CARDIOLOGY CLINIC | Age: 65
End: 2025-08-12